# Patient Record
Sex: FEMALE | Race: WHITE | Employment: UNEMPLOYED | URBAN - METROPOLITAN AREA
[De-identification: names, ages, dates, MRNs, and addresses within clinical notes are randomized per-mention and may not be internally consistent; named-entity substitution may affect disease eponyms.]

---

## 2017-05-08 ENCOUNTER — OFFICE VISIT (OUTPATIENT)
Dept: INTERNAL MEDICINE CLINIC | Age: 57
End: 2017-05-08

## 2017-05-08 VITALS
SYSTOLIC BLOOD PRESSURE: 118 MMHG | DIASTOLIC BLOOD PRESSURE: 76 MMHG | WEIGHT: 132 LBS | HEIGHT: 68 IN | HEART RATE: 60 BPM | OXYGEN SATURATION: 98 % | BODY MASS INDEX: 20 KG/M2 | TEMPERATURE: 98.2 F

## 2017-05-08 DIAGNOSIS — Z01.818 PREOP EXAMINATION: Primary | ICD-10-CM

## 2017-05-08 DIAGNOSIS — R35.0 URINARY FREQUENCY: ICD-10-CM

## 2017-05-08 DIAGNOSIS — E03.9 ACQUIRED HYPOTHYROIDISM: ICD-10-CM

## 2017-05-08 DIAGNOSIS — J30.9 ALLERGIC RHINITIS, UNSPECIFIED ALLERGIC RHINITIS TRIGGER, UNSPECIFIED RHINITIS SEASONALITY: ICD-10-CM

## 2017-05-08 PROCEDURE — 99242 OFF/OP CONSLTJ NEW/EST SF 20: CPT | Performed by: NURSE PRACTITIONER

## 2017-05-08 ASSESSMENT — ENCOUNTER SYMPTOMS
SHORTNESS OF BREATH: 0
BACK PAIN: 1
CHEST TIGHTNESS: 0
ABDOMINAL PAIN: 0

## 2017-07-05 ENCOUNTER — PATIENT MESSAGE (OUTPATIENT)
Dept: INTERNAL MEDICINE CLINIC | Age: 57
End: 2017-07-05

## 2017-07-19 ENCOUNTER — TELEPHONE (OUTPATIENT)
Dept: INTERNAL MEDICINE CLINIC | Age: 57
End: 2017-07-19

## 2018-03-09 ENCOUNTER — TELEPHONE (OUTPATIENT)
Dept: INTERNAL MEDICINE CLINIC | Age: 58
End: 2018-03-09

## 2018-03-09 DIAGNOSIS — E03.9 ACQUIRED HYPOTHYROIDISM: Primary | ICD-10-CM

## 2018-03-28 ENCOUNTER — TELEPHONE (OUTPATIENT)
Dept: FAMILY MEDICINE CLINIC | Age: 58
End: 2018-03-28

## 2018-03-29 NOTE — TELEPHONE ENCOUNTER
Jenni Whalen - you should be able to view the document now. Let me know if there are problems viewing.

## 2018-04-25 ENCOUNTER — TELEPHONE (OUTPATIENT)
Dept: FAMILY MEDICINE CLINIC | Age: 58
End: 2018-04-25

## 2018-10-05 ENCOUNTER — TELEPHONE (OUTPATIENT)
Dept: INTERNAL MEDICINE CLINIC | Age: 58
End: 2018-10-05

## 2018-10-05 DIAGNOSIS — E89.40 SURGICAL MENOPAUSE: Primary | ICD-10-CM

## 2018-10-05 DIAGNOSIS — E03.9 ACQUIRED HYPOTHYROIDISM: ICD-10-CM

## 2018-10-05 NOTE — TELEPHONE ENCOUNTER
Needs an order for a bone density test faxed to [facility] at [phone]  Please call patient to let her know it was faxed

## 2018-10-24 DIAGNOSIS — Z87.39 HISTORY OF OSTEOPENIA: Primary | ICD-10-CM

## 2018-10-29 DIAGNOSIS — Z87.39 HISTORY OF OSTEOPENIA: ICD-10-CM

## 2018-10-29 LAB — VITAMIN D 25-HYDROXY: 69.3 NG/ML

## 2018-12-13 ENCOUNTER — OFFICE VISIT (OUTPATIENT)
Dept: INTERNAL MEDICINE CLINIC | Age: 58
End: 2018-12-13
Payer: COMMERCIAL

## 2018-12-13 VITALS
TEMPERATURE: 98.7 F | WEIGHT: 135.8 LBS | BODY MASS INDEX: 20.65 KG/M2 | DIASTOLIC BLOOD PRESSURE: 56 MMHG | HEART RATE: 67 BPM | SYSTOLIC BLOOD PRESSURE: 100 MMHG | OXYGEN SATURATION: 99 %

## 2018-12-13 DIAGNOSIS — J04.0 LARYNGITIS: Primary | ICD-10-CM

## 2018-12-13 DIAGNOSIS — J20.9 ACUTE BRONCHITIS, UNSPECIFIED ORGANISM: ICD-10-CM

## 2018-12-13 PROCEDURE — 99213 OFFICE O/P EST LOW 20 MIN: CPT | Performed by: INTERNAL MEDICINE

## 2018-12-13 RX ORDER — ESTRADIOL 0.1 MG/G
CREAM VAGINAL
Refills: 6 | COMMUNITY
Start: 2018-10-18

## 2018-12-13 RX ORDER — BENZONATATE 100 MG/1
100 CAPSULE ORAL 3 TIMES DAILY PRN
Qty: 30 CAPSULE | Refills: 0 | Status: SHIPPED | OUTPATIENT
Start: 2018-12-13 | End: 2018-12-20

## 2018-12-13 RX ORDER — VALACYCLOVIR HYDROCHLORIDE 500 MG/1
500 TABLET, FILM COATED ORAL 2 TIMES DAILY PRN
Qty: 60 TABLET | Refills: 1 | Status: SHIPPED | OUTPATIENT
Start: 2018-12-13

## 2018-12-13 RX ORDER — AZITHROMYCIN 250 MG/1
TABLET, FILM COATED ORAL
Qty: 6 TABLET | Refills: 0 | Status: SHIPPED | OUTPATIENT
Start: 2018-12-13 | End: 2018-12-23

## 2018-12-13 RX ORDER — LEVOTHYROXINE SODIUM 88 MCG
88 TABLET ORAL DAILY
COMMUNITY
Start: 2018-11-28

## 2018-12-13 ASSESSMENT — ENCOUNTER SYMPTOMS
SORE THROAT: 0
SHORTNESS OF BREATH: 0

## 2019-01-11 ENCOUNTER — CLINICAL DOCUMENTATION (OUTPATIENT)
Dept: INTERNAL MEDICINE CLINIC | Age: 59
End: 2019-01-11

## 2019-01-15 ENCOUNTER — CLINICAL DOCUMENTATION (OUTPATIENT)
Dept: INTERNAL MEDICINE CLINIC | Age: 59
End: 2019-01-15

## 2019-02-06 ENCOUNTER — CLINICAL DOCUMENTATION (OUTPATIENT)
Dept: INTERNAL MEDICINE CLINIC | Age: 59
End: 2019-02-06

## 2019-03-28 ENCOUNTER — OFFICE VISIT (OUTPATIENT)
Dept: INTERNAL MEDICINE CLINIC | Age: 59
End: 2019-03-28
Payer: COMMERCIAL

## 2019-03-28 VITALS
BODY MASS INDEX: 20.59 KG/M2 | TEMPERATURE: 98.2 F | HEART RATE: 75 BPM | SYSTOLIC BLOOD PRESSURE: 96 MMHG | WEIGHT: 135.4 LBS | OXYGEN SATURATION: 98 % | DIASTOLIC BLOOD PRESSURE: 62 MMHG

## 2019-03-28 DIAGNOSIS — J30.2 SEASONAL ALLERGIC RHINITIS, UNSPECIFIED TRIGGER: Primary | ICD-10-CM

## 2019-03-28 PROCEDURE — 99213 OFFICE O/P EST LOW 20 MIN: CPT | Performed by: INTERNAL MEDICINE

## 2019-03-28 ASSESSMENT — ENCOUNTER SYMPTOMS
COUGH: 0
RHINORRHEA: 1
SORE THROAT: 1
TROUBLE SWALLOWING: 1
SHORTNESS OF BREATH: 0

## 2019-03-28 ASSESSMENT — PATIENT HEALTH QUESTIONNAIRE - PHQ9
2. FEELING DOWN, DEPRESSED OR HOPELESS: 0
1. LITTLE INTEREST OR PLEASURE IN DOING THINGS: 0
SUM OF ALL RESPONSES TO PHQ9 QUESTIONS 1 & 2: 0
SUM OF ALL RESPONSES TO PHQ QUESTIONS 1-9: 0
SUM OF ALL RESPONSES TO PHQ QUESTIONS 1-9: 0

## 2019-04-01 ENCOUNTER — TELEPHONE (OUTPATIENT)
Dept: INTERNAL MEDICINE CLINIC | Age: 59
End: 2019-04-01

## 2019-04-02 ENCOUNTER — OFFICE VISIT (OUTPATIENT)
Dept: INTERNAL MEDICINE CLINIC | Age: 59
End: 2019-04-02
Payer: COMMERCIAL

## 2019-04-02 VITALS
SYSTOLIC BLOOD PRESSURE: 94 MMHG | TEMPERATURE: 98.1 F | WEIGHT: 135 LBS | HEIGHT: 68 IN | OXYGEN SATURATION: 99 % | HEART RATE: 77 BPM | DIASTOLIC BLOOD PRESSURE: 70 MMHG | BODY MASS INDEX: 20.46 KG/M2

## 2019-04-02 DIAGNOSIS — J20.9 BRONCHITIS, ACUTE, WITH BRONCHOSPASM: Primary | ICD-10-CM

## 2019-04-02 PROCEDURE — 99214 OFFICE O/P EST MOD 30 MIN: CPT | Performed by: INTERNAL MEDICINE

## 2019-04-02 RX ORDER — AZITHROMYCIN 250 MG/1
TABLET, FILM COATED ORAL
Qty: 1 PACKET | Refills: 0 | Status: SHIPPED | OUTPATIENT
Start: 2019-04-02 | End: 2019-04-12

## 2019-04-02 RX ORDER — BENZONATATE 200 MG/1
200 CAPSULE ORAL 3 TIMES DAILY PRN
Qty: 30 CAPSULE | Refills: 0 | Status: SHIPPED | OUTPATIENT
Start: 2019-04-02 | End: 2019-04-09

## 2019-04-02 NOTE — PROGRESS NOTES
Subjective  Patient complains of deep cough, congestion and phlegm production for the last several days. Also has had shortness of breath and wheezing. Symptoms get worse in the morning and at night and get better as the day progresses. The illness shows no sign of improvement. Complains of chest pressure and pain during a coughing episode. Has had fever and chills. No chest pain. Very tired and fatigued. Has had body aches. No nausea or emesis. No pedal edema. No dizziness. No acid reflux. Denies sore throat or otalgias. Tried over the counter medications with not much improvement. Has also tried inhalers with some help. Patient has been exposed to some people at work/home with similar symptoms. Non-smoker. No exposure to sick contacts,   Allergies   Allergen Reactions    Dextromethorphan Swelling    Ciprofloxacin Rash    Codeine     Penicillins     Sulfa Antibiotics     Morphine And Related Rash      Objective  BP 94/70 (Site: Right Upper Arm, Position: Sitting, Cuff Size: Medium Adult)   Pulse 77   Temp 98.1 °F (36.7 °C) (Oral)   Ht 5' 8\" (1.727 m)   Wt 135 lb (61.2 kg)   LMP 02/26/2016   SpO2 99%   BMI 20.53 kg/m²    Patient  looks ill to a mild to moderate extent. Visibly short of breath. Has audible wheezes. has some sinus tenderness. Ears show tympanic membrane congestion. Pharynx shows some posterior congestion and some postnasal drainage. Has some mild tender neck lymphadenopathy. Examination of the lungs reveal mildly labored respirations. Also has diffuse wheezing and rhonchi. No crackles heard. no rub or fremitus noted. Breath sounds have lowered intensity and has a prolonged expiratory phase. Heart sounds are normal, regular rate and rhythm, no murmur, gallop or rub noted. Apical impulse is in the left fifth ICS and appears normal.    Assessment  Bronchitis  Bronchospasm  Plan  Will start antibiotics, a tapering course of steroids and inhalers. Will start bronchodilators.  Also recommend that the patient start some otc decongestants. Asked the patient to consume a lot of fluids, avoid greasy foods and smoke. Allergies could be playing a role in patients symptoms. Trying otc allergy meds like claritin, allegra or zyrtec would also be an option. Call us if symptoms do not improve in 2-3 days.

## 2020-03-25 PROBLEM — J30.9 ALLERGIC RHINITIS: Status: RESOLVED | Noted: 2020-03-25 | Resolved: 2020-03-24

## 2020-05-05 ENCOUNTER — TELEPHONE (OUTPATIENT)
Dept: PRIMARY CARE CLINIC | Age: 60
End: 2020-05-05

## 2020-09-29 ENCOUNTER — TELEPHONE (OUTPATIENT)
Dept: PRIMARY CARE CLINIC | Age: 60
End: 2020-09-29

## 2020-09-29 NOTE — TELEPHONE ENCOUNTER
Pt called to ask if she should get her flu shot even tho she has had ear pain for 8 days an she used wax ear plugs 8 days ago and that was when they started hurting .

## 2020-10-22 ENCOUNTER — OFFICE VISIT (OUTPATIENT)
Dept: PRIMARY CARE CLINIC | Age: 60
End: 2020-10-22

## 2020-10-22 PROCEDURE — 99211 OFF/OP EST MAY X REQ PHY/QHP: CPT | Performed by: NURSE PRACTITIONER

## 2020-10-24 LAB — SARS-COV-2, NAA: NOT DETECTED

## 2020-10-26 NOTE — RESULT ENCOUNTER NOTE

## 2021-01-05 ENCOUNTER — OFFICE VISIT (OUTPATIENT)
Dept: PRIMARY CARE CLINIC | Age: 61
End: 2021-01-05

## 2021-01-05 DIAGNOSIS — Z20.828 EXPOSURE TO SARS-ASSOCIATED CORONAVIRUS: Primary | ICD-10-CM

## 2021-01-05 NOTE — PROGRESS NOTES
Alma Mora received a viral test for COVID-19. They were educated on isolation and quarantine as appropriate. For any symptoms, they were directed to seek care from their PCP, given contact information to establish with a doctor, directed to an urgent care or the emergency room.

## 2021-01-05 NOTE — PATIENT INSTRUCTIONS

## 2021-01-06 LAB — SARS-COV-2, NAA: NOT DETECTED

## 2021-01-07 ENCOUNTER — OFFICE VISIT (OUTPATIENT)
Dept: ORTHOPEDIC SURGERY | Age: 61
End: 2021-01-07
Payer: COMMERCIAL

## 2021-01-07 VITALS — TEMPERATURE: 98.4 F | WEIGHT: 134.92 LBS | HEIGHT: 68 IN | BODY MASS INDEX: 20.45 KG/M2

## 2021-01-07 DIAGNOSIS — M54.12 RADICULITIS OF RIGHT CERVICAL REGION: ICD-10-CM

## 2021-01-07 DIAGNOSIS — S46.011A STRAIN OF RIGHT ROTATOR CUFF CAPSULE, INITIAL ENCOUNTER: ICD-10-CM

## 2021-01-07 DIAGNOSIS — M47.812 CERVICAL SPONDYLOSIS: ICD-10-CM

## 2021-01-07 DIAGNOSIS — M25.511 ACUTE PAIN OF RIGHT SHOULDER: ICD-10-CM

## 2021-01-07 DIAGNOSIS — M50.30 DDD (DEGENERATIVE DISC DISEASE), CERVICAL: ICD-10-CM

## 2021-01-07 DIAGNOSIS — M54.2 NECK PAIN ON RIGHT SIDE: ICD-10-CM

## 2021-01-07 PROCEDURE — 99203 OFFICE O/P NEW LOW 30 MIN: CPT | Performed by: INTERNAL MEDICINE

## 2021-01-07 RX ORDER — CYCLOBENZAPRINE HCL 10 MG
10 TABLET ORAL NIGHTLY PRN
Qty: 30 TABLET | Refills: 1 | Status: SHIPPED | OUTPATIENT
Start: 2021-01-07 | End: 2022-01-06 | Stop reason: ALTCHOICE
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
## 2021-01-11 ENCOUNTER — TELEPHONE (OUTPATIENT)
Dept: PRIMARY CARE CLINIC | Age: 61
End: 2021-01-11

## 2021-01-11 NOTE — TELEPHONE ENCOUNTER
----- Message from Stanislav Yancey sent at 1/11/2021  2:39 PM EST -----  Subject: Message to Provider    QUESTIONS  Information for Provider? Pt is fearful of COVID and wanted to know if she   needs to come in for annual physical or if lab work would be okay.   ---------------------------------------------------------------------------  --------------  [address]  What is the best way for the office to contact you? OK to leave message on   voicemail  Preferred Call Back Phone Number? [phone]  ---------------------------------------------------------------------------  --------------  SCRIPT ANSWERS  Relationship to Patient?  Self

## 2021-01-13 ENCOUNTER — OFFICE VISIT (OUTPATIENT)
Dept: PRIMARY CARE CLINIC | Age: 61
End: 2021-01-13
Payer: COMMERCIAL

## 2021-01-13 VITALS
HEART RATE: 88 BPM | OXYGEN SATURATION: 98 % | TEMPERATURE: 97.9 F | DIASTOLIC BLOOD PRESSURE: 68 MMHG | RESPIRATION RATE: 14 BRPM | BODY MASS INDEX: 20.16 KG/M2 | WEIGHT: 133 LBS | SYSTOLIC BLOOD PRESSURE: 100 MMHG | HEIGHT: 68 IN

## 2021-01-13 DIAGNOSIS — Z20.822 CONTACT WITH AND (SUSPECTED) EXPOSURE TO COVID-19: ICD-10-CM

## 2021-01-13 DIAGNOSIS — Z00.00 PHYSICAL EXAM, ANNUAL: Primary | ICD-10-CM

## 2021-01-13 DIAGNOSIS — F41.9 ANXIETY: ICD-10-CM

## 2021-01-13 DIAGNOSIS — E03.9 ACQUIRED HYPOTHYROIDISM: Chronic | ICD-10-CM

## 2021-01-13 DIAGNOSIS — Z87.39 HISTORY OF OSTEOPENIA: ICD-10-CM

## 2021-01-13 PROCEDURE — 99396 PREV VISIT EST AGE 40-64: CPT | Performed by: INTERNAL MEDICINE

## 2021-01-13 SDOH — ECONOMIC STABILITY: FOOD INSECURITY: WITHIN THE PAST 12 MONTHS, YOU WORRIED THAT YOUR FOOD WOULD RUN OUT BEFORE YOU GOT MONEY TO BUY MORE.: NEVER TRUE

## 2021-01-13 SDOH — ECONOMIC STABILITY: INCOME INSECURITY: HOW HARD IS IT FOR YOU TO PAY FOR THE VERY BASICS LIKE FOOD, HOUSING, MEDICAL CARE, AND HEATING?: NOT VERY HARD

## 2021-01-13 ASSESSMENT — PATIENT HEALTH QUESTIONNAIRE - PHQ9
1. LITTLE INTEREST OR PLEASURE IN DOING THINGS: 0
SUM OF ALL RESPONSES TO PHQ9 QUESTIONS 1 & 2: 0
SUM OF ALL RESPONSES TO PHQ QUESTIONS 1-9: 0

## 2021-01-13 NOTE — PROGRESS NOTES
Chief complaints:  Shoaib Maharaj is a 64 y.o. female who presents to the office for a general physical examination. History of present illness:  Here for a physical and fasting blood work. Acquired hypothyroidism  On synthroid,  Patient is compliant w medications, no side effects, effective, provides adequate symptom relief. No new symptoms or problems as noted by patient. The problem is stable, no changes noted by patient. Will consider monitoring labs and refill medications as appropriate. Patient counseled and will continue current plan. Anxiety  Not on any meds,   Stable. History of osteopenia  Will check vit D levels,      Past Medical History:   Diagnosis Date    Allergic rhinitis, cause unspecified     Anxiety state, unspecified     Chronic lymphocytic thyroiditis     Colon polyp 2019    Diverticulitis of colon (without mention of hemorrhage)(562.11)     ? diagnosis; no diverticula noted on colonoscopy    Herpes simplex without mention of complication     Hormone replacement therapy (postmenopausal)     Mechelle Macias    Hypothyroidism     Left thyroid nodule 2017    repeat thyroid sonogram in Dec, 2017.  Nontoxic uninodular goiter     Osteopenia after menopause 2018    spine    Rosacea     Unspecified glaucoma(365.9)     Unspecified inflammatory disease of uterus      Current Outpatient Medications   Medication Sig Dispense Refill    Cetirizine HCl (ZYRTEC ALLERGY PO) Take by mouth daily      estradiol (ESTRACE) 0.1 MG/GM vaginal cream INSERT 1 GRAM VAGINALLY WITH APPLICATOR THREE TIMES WEEKLY  6    SYNTHROID 88 MCG tablet Take 88 mcg by mouth daily      calcium carbonate-vitamin D (CALTRATE) 600-400 MG-UNIT TABS per tab Take 1 tablet by mouth.       cyclobenzaprine (FLEXERIL) 10 MG tablet Take 1 tablet by mouth nightly as needed for Muscle spasms (Patient not taking: Reported on 1/13/2021) 30 tablet 1    valACYclovir (VALTREX) 500 MG tablet Take 1 tablet by mouth 2 times daily as needed (herpes outbreak) (Patient not taking: Reported on 1/13/2021) 60 tablet 1     No current facility-administered medications for this visit. Allergies : Dextromethorphan, Ciprofloxacin, Codeine, Penicillins, Sulfa antibiotics, and Morphine and related  Past Surgical History:   Procedure Laterality Date    COLONOSCOPY  2019    Repeat in 2024 - 5 yrs    LAPAROSCOPY      ovarian cysts, multiple laparoscopies    ARTURO AND BSO  02/26/2016    Dr. Reynaldo Calixto     Family History   Problem Relation Age of Onset    Diabetes Mother     Other Mother         PUD    Coronary Art Dis Father         CABG     Social History     Tobacco Use    Smoking status: Never Smoker    Smokeless tobacco: Never Used   Substance Use Topics    Alcohol use: No       Review of systems :  Skin: no abnormal pigmentation, rash, scaling, itching, masses, hair or nail changes  Eyes: negative  Ears/Nose/Throat: negative  Respiratory: negative  Cardiovascular: negative  Gastrointestinal: negative  Genitourinary: negative  Musculoskeletal: negative  Neurologic: negative  Psychiatric: negative  Hematologic/Lymphatic/Immunologic: negative  Endocrine: negative       Objective :     /68 (Site: Left Upper Arm, Position: Sitting)   Pulse 88   Temp 97.9 °F (36.6 °C) (Skin)   Resp 14   Ht 5' 8\" (1.727 m)   Wt 133 lb (60.3 kg)   LMP 02/26/2016   SpO2 98%   BMI 20.22 kg/m²   General appearance - healthy, alert, no distress  Skin - Skin color, texture, turgor normal. No rashes or lesions. Head - Normocephalic. No masses, lesions, tenderness or abnormalities  Eyes - conjunctivae/corneas clear. PERRL, EOM's intact. Ears - External ears normal. Canals clear. TM's normal.  Nose/Sinuses - Nares normal. Septum midline. Mucosa normal. No drainage or sinus tenderness. Oropharynx - Lips, mucosa, and tongue normal. Teeth and gums normal. Oropharynx pink and patent  Neck - Neck supple. No adenopathy.  Thyroid symmetric, normal size,  Back - Back symmetric, no curvature. ROM normal. No CVA tenderness. Lungs - Percussion normal. Good diaphragmatic excursion. Lungs clear  Heart - Regular rate and rhythm, with no rub, murmur or gallop noted. Abdomen - Abdomen soft, non-tender. BS normal. No masses, organomegaly  Extremities - Extremities normal. No deformities, edema, or skin discolora  Musculoskeletal - Spine ROM normal. Muscular strength intact. Peripheral pulses - radial=2+,, femoral=2+, popliteal=2+, dorsalis pedis=2+,  Neuro - Gait normal. Reflexes normal and symmetric. Sensation grossly normal.  No focal weakness       Assessment :     Physical exam,  Acquired hypothyroidism  On synthroid,  Patient is compliant w medications, no side effects, effective, provides adequate symptom relief. No new symptoms or problems as noted by patient. The problem is stable, no changes noted by patient. Will consider monitoring labs and refill medications as appropriate. Patient counseled and will continue current plan. Anxiety  Not on any meds,   Stable. History of osteopenia  Will check vit D levels,         Plan :     As above,  Labs ordered, reviewed. Medications refilled. All Health maintenance needs reviewed and the needful ordered.        Nicolette Angel MD  1/13/2021 9:33 AM

## 2021-01-15 DIAGNOSIS — Z00.00 PHYSICAL EXAM, ANNUAL: ICD-10-CM

## 2021-01-15 DIAGNOSIS — Z20.822 CONTACT WITH AND (SUSPECTED) EXPOSURE TO COVID-19: ICD-10-CM

## 2021-01-15 LAB
A/G RATIO: 2.6 (ref 1.1–2.2)
ALBUMIN SERPL-MCNC: 4.6 G/DL (ref 3.4–5)
ALP BLD-CCNC: 59 U/L (ref 40–129)
ALT SERPL-CCNC: 20 U/L (ref 10–40)
ANION GAP SERPL CALCULATED.3IONS-SCNC: 14 MMOL/L (ref 3–16)
AST SERPL-CCNC: 20 U/L (ref 15–37)
BASOPHILS ABSOLUTE: 0 K/UL (ref 0–0.2)
BASOPHILS RELATIVE PERCENT: 1.5 %
BILIRUB SERPL-MCNC: 0.7 MG/DL (ref 0–1)
BUN BLDV-MCNC: 11 MG/DL (ref 7–20)
CALCIUM SERPL-MCNC: 9.7 MG/DL (ref 8.3–10.6)
CHLORIDE BLD-SCNC: 103 MMOL/L (ref 99–110)
CHOLESTEROL, TOTAL: 198 MG/DL (ref 0–199)
CO2: 25 MMOL/L (ref 21–32)
CREAT SERPL-MCNC: 0.7 MG/DL (ref 0.6–1.1)
EOSINOPHILS ABSOLUTE: 0.1 K/UL (ref 0–0.6)
EOSINOPHILS RELATIVE PERCENT: 1.7 %
GFR AFRICAN AMERICAN: >60
GFR NON-AFRICAN AMERICAN: >60
GLOBULIN: 1.8 G/DL
GLUCOSE BLD-MCNC: 82 MG/DL (ref 70–99)
HCT VFR BLD CALC: 46 % (ref 36–48)
HDLC SERPL-MCNC: 74 MG/DL (ref 40–60)
HEMOGLOBIN: 15.6 G/DL (ref 12–16)
LDL CHOLESTEROL CALCULATED: 109 MG/DL
LYMPHOCYTES ABSOLUTE: 1.1 K/UL (ref 1–5.1)
LYMPHOCYTES RELATIVE PERCENT: 35.2 %
MCH RBC QN AUTO: 31.3 PG (ref 26–34)
MCHC RBC AUTO-ENTMCNC: 33.9 G/DL (ref 31–36)
MCV RBC AUTO: 92.2 FL (ref 80–100)
MONOCYTES ABSOLUTE: 0.3 K/UL (ref 0–1.3)
MONOCYTES RELATIVE PERCENT: 9.9 %
NEUTROPHILS ABSOLUTE: 1.6 K/UL (ref 1.7–7.7)
NEUTROPHILS RELATIVE PERCENT: 51.7 %
PDW BLD-RTO: 13.2 % (ref 12.4–15.4)
PLATELET # BLD: 231 K/UL (ref 135–450)
PMV BLD AUTO: 9.2 FL (ref 5–10.5)
POTASSIUM SERPL-SCNC: 3.9 MMOL/L (ref 3.5–5.1)
RBC # BLD: 4.99 M/UL (ref 4–5.2)
SARS-COV-2 ANTIBODY, TOTAL: NEGATIVE
SODIUM BLD-SCNC: 142 MMOL/L (ref 136–145)
TOTAL PROTEIN: 6.4 G/DL (ref 6.4–8.2)
TRIGL SERPL-MCNC: 73 MG/DL (ref 0–150)
TSH SERPL DL<=0.05 MIU/L-ACNC: 1.86 UIU/ML (ref 0.27–4.2)
VITAMIN D 25-HYDROXY: 53.2 NG/ML
VLDLC SERPL CALC-MCNC: 15 MG/DL
WBC # BLD: 3.1 K/UL (ref 4–11)

## 2021-01-16 LAB
ESTIMATED AVERAGE GLUCOSE: 91.1 MG/DL
HBA1C MFR BLD: 4.8 %

## 2021-01-20 ENCOUNTER — HOSPITAL ENCOUNTER (OUTPATIENT)
Dept: PHYSICAL THERAPY | Age: 61
Setting detail: THERAPIES SERIES
Discharge: HOME | End: 2021-01-20
Payer: COMMERCIAL

## 2021-01-20 PROCEDURE — 97161 PT EVAL LOW COMPLEX 20 MIN: CPT

## 2021-01-20 PROCEDURE — 97110 THERAPEUTIC EXERCISES: CPT

## 2021-01-20 NOTE — PLAN OF CARE
The [address]  Phone [phone]  Fax [phone]                                                          Physical Therapy Certification    Dear Referring Practitioner: Radha Wiggins MD,    We had the pleasure of evaluating the following patient for physical therapy services at [address] [place] [address]. A summary of our findings can be found in the initial assessment below. This includes our plan of care. If you have any questions or concerns regarding these findings, please do not hesitate to contact me at the office phone number checked above. Thank you for the referral.       Physician Signature:_______________________________Date:__________________  By signing above (or electronic signature), therapists plan is approved by physician      Patient: Melany Morales   : 1960   MRN: [id number]  Referring Physician: Referring Practitioner: Radha Wiggins MD      Evaluation Date: 2021      Medical Diagnosis Information:  Diagnosis: O40.818 (ICD-10-CM) - Cervical spondylosis. M50.30 (ICD-10-CM) - DDD (degenerative disc disease), cervical. M54.12 (ICD-10-CM) - Radiculitis of right cervical region. S46.011A (ICD-10-CM) - Strain of right rotator cuff capsule, initial encounter   Treatment Diagnosis: R shoulder stiffness M25.611.  Cervical pain M54.2                                         Insurance information: PT Insurance Information: BCBS    Precautions/ Contra-indications: Osteopenia after menopause    Latex Allergy:  []NO      []YES  Preferred Language for Healthcare:   [x]English       []other: [] Yes, Patient intake triggered further evaluation      [] C-SSRS Screening completed  [] PCP notified via Plan of Care  [] Emergency services notified     Functional Disability Index: UEFI:  26% deficit 1/20/21    Pain Scale: 0/10 at rest, but states she feels weak and very minimal tingling in hand at rest   Easing factors: OTC NSAIDs, activity modification for 1 month, and chiropractor/manipulation  Provocative factors: Cleaning, working out, cooking, changing positions when sleeping      Type: []Constant   [x]Intermittent  [x]Radiating []Localized []other:       Numbness/Tingling: At rest, very minimal tingling sensation in all fingers of R hand. With certain movements, this sensation increases.       Occupation/School: Pt is employed and works at [employer]     Living Status/Prior Level of Function: Independent with ADLs and IADLs    OBJECTIVE:      Cervical ROM AROM Comments   Flexion 55 Repeated flex neg for reproduction of symptoms   Extension 45 Repeated ext neg for reproduction of symptoms   Side bend R 23    Side bend L 31    Rotation R 49    Rotation L 45      Shoulder ROM PROM AROM  Comment    L R L R    Flexion 178 174  166 131 tingling in all fingers  R shoulder hike   Abduction  180  143 tingling in all fingers R shoulder hike    96 at 90 abd      IR 90 58 at 90 abd         Strength L R Comments   Neck flexion (C1-2) 4- 4-    Neck side bend (C3) 4 4    Shoulder elevation (C4) 5 5    Shoulder abduction (C5) 4+ 4-    Elbow flexion   (C6) 5 5    Elbow extension   (C7) 4+ 4+    ER 4- 4-    IR 4+ 4    Supraspinatus  4- 4    Ulnar deviation ((C8) 5 5    Abduction and /or adduction of hand intrinsics (T1) WNL WNL    Hand dynamometer  test 16 lbs of force  15 lbs of force Highest # recorded of 3 trials   DTR L R Comments   Biceps (C5,C6) 2+ 2+    Brachioradialis (C5,C6)      Triceps (C7,C8) 1+ 1+                Special Tests Results/Comment   Axial compression Negative Spurling's Pt apprehensive, full Spurling's unable to be completed  Negative with extension & compression   Negative with extension and R SB (no compression)  Negative with extension and L SB (no compression)   ULNT Positive R median and ulnar nerve tension   Negative R radial nerve tension   Negative L median ulnar and radial nerve tension    Distraction test Negative   Hawkin's Abel Negative bilat  Excessive shoulder hike on R    Neers Negative bilat   Belly press  Negative on R   Empty can  Negative bilat   Phalen's  Reverse phalens 30 seconds: Negative   30 seconds: Negative        Joint mobility:    []Normal    [x]Hypo Mild-Mod restriction in cervical spine with P/A mobilizations, and R>L mild-mod restriction in lateral gliding. Moderate restriction   []Hyper    Palpation: Tightness noted in bilat upper trap and cervical paraspinal musculature. Pt did not complain of tenderness upon palpation. Mild guarding noted upon testing joint mobility in cervical spine. Functional Mobility/Transfers: Independent    Posture: Pt sits with bilat protraction R>L, R shoulder elevation, moderate forward head, and bilat scapular abduction. Bandages/Dressings/Incisions: N/A    Gait: (include devices/WB status): WNL     Orthopedic Special Tests: See above. [x] Patient history, allergies, meds reviewed. Medical chart reviewed. See intake form. Review Of Systems (ROS):  [x]Performed Review of systems (Integumentary, CardioPulmonary, Neurological) by intake and observation. Intake form has been scanned into medical record. Patient has been instructed to contact their primary care physician regarding ROS issues if not already being addressed at this time.       Co-morbidities/Complexities (which will affect course of rehabilitation):   []None           Arthritic conditions   []Rheumatoid arthritis (M05.9)  []Osteoarthritis (M19.91)   Cardiovascular conditions   []Hypertension (I10) Functional Activity Limitations (from functional questionnaire and intake)   []Reduced ability to tolerate prolonged functional positions   []Reduced ability or difficulty with changes of positions or transfers between positions   [x]Reduced ability to maintain good posture and demonstrate good body mechanics with sitting, bending, and lifting   [x] Reduced ability or tolerance with driving and/or computer work   [x]Reduced ability to perform lifting, reaching, carrying tasks   []Reduced ability to concentrate   [x]Reduced ability to sleep    []Reduced ability to tolerate any impact through UE or spine   []Reduced ability to ambulate prolonged functional periods/distances   []other:    Participation Restrictions   [x]Reduced participation in self care activities   [x]Reduced participation in home management activities   [x]Reduced participation in work activities   [x]Reduced participation in social activities. [x]Reduced participation in sport/recreational activities. Classification/Subgrouping:   []signs/symptoms consistent with neck pain with mobility deficits     []signs/symptoms consistent with neck pain with movement coordinated impairments    [x]signs/symptoms consistent with neck pain with radiating pain    []signs/symptoms consistent with neck pain with headaches (cervicogenic)    []Signs/symptoms consistent with nerve root involvement including myotome & dermatome dysfunction   [x]sign/symptoms consistent with facet dysfunction of cervical and thoracic spine    []signs/symptoms consistent suggesting central cord compression/UMN syndromes   []signs/symptoms consistent with discogenic cervical pain   []signs/symptoms consistent with rib dysfunction   [x]signs/symptoms consistent with postural dysfunction   []signs/symptoms consistent with shoulder pathology    []signs/symptoms consistent with post-surgical status including decreased ROM, strength and function. []signs/symptoms consistent with pathology which may benefit from Dry Needling   []signs/symptoms which may limit the use of advanced manual therapy techniques: (Hypertension, recent trauma, intolerance to end range positions, prior TIA, visual issues, UE myotomes loss )     Prognosis/Rehab Potential:      []Excellent   [x]Good    []Fair   []Poor    Tolerance of evaluation/treatment:    []Excellent   [x]Good    []Fair   []Poor      Physical Therapy Evaluation Complexity Justification  [x] A history of present problem with:  [] no personal factors and/or comorbidities that impact the plan of care;  [x]1-2 personal factors and/or comorbidities that impact the plan of care  []3 personal factors and/or comorbidities that impact the plan of care  [x] An examination of body systems using standardized tests and measures addressing any of the following: body structures and functions (impairments), activity limitations, and/or participation restrictions;:  [x] a total of 1-2 or more elements   [] a total of 3 or more elements   [] a total of 4 or more elements   [x] A clinical presentation with:  [x] stable and/or uncomplicated characteristics   [] evolving clinical presentation with changing characteristics  [] unstable and unpredictable characteristics;   [x] Clinical decision making of [x] low, [] moderate, [] high complexity using standardized patient assessment instrument and/or measurable assessment of functional outcome. [x] EVAL (LOW) 55563 (typically 20 minutes face-to-face)  [] EVAL (MOD) 31671 (typically 30 minutes face-to-face)  [] EVAL (HIGH) 95796 (typically 45 minutes face-to-face)  [] RE-EVAL     PLAN:   Frequency/Duration:  2 days per week for 4 Weeks:  Interventions:  [x]  Therapeutic exercise including: strength training, ROM, for cervical spine,scapula, core and Upper extremity, including postural re-education. [] Progressing: [] Met: [] Not Met: [] Adjusted    Long Term Goals: To be achieved in: 4 weeks  1. Disability index score of 10% or less for the UEFI to assist with reaching prior level of function. [] Progressing: [] Met: [] Not Met: [] Adjusted  2. Patient will demonstrate increased AROM and PROM of the R GHJ to equal the L GHJ to allow for proper joint functioning as indicated by patients Functional Deficits. [] Progressing: [] Met: [] Not Met: [] Adjusted  3. Patient will demonstrate increase strength of shoulder and cervical musculature to 4+/5 in order to allow for proper functional mobility as indicated by patient's Functional Deficits. [] Progressing: [] Met: [] Not Met: [] Adjusted  4. Patient will return to all functional activities without increased symptoms or restriction. [] Progressing: [] Met: [] Not Met: [] Adjusted  5. Patient will be able to participate in her normal workout regimen fully without increased symptoms or restriction. [] Progressing: [] Met: [] Not Met: [] Adjusted     Electronically signed by:  SAUL Enriquez, Student Physical Therapist  Therapist was present, directed the patient's care, made skilled judgement, and was responsible for assessment and treatment of the patient. Note: If patient does not return for scheduled/ recommended follow up visits, this note will serve as a discharge from care along with most recent update on progress.

## 2021-01-20 NOTE — FLOWSHEET NOTE
[facility]  Orthopaedics and Sports Rehabilitation, [facility] [place], [address] [place] [address]  Phone: [phone]   Fax:     [phone]                                                     Physical Therapy Daily Treatment Note  Date:  2021    Patient Name:  Ana Maria López    :  1960  MRN: [id number]  Restrictions/Precautions:    Medical/Treatment Diagnosis Information:  Diagnosis: R76.329 (ICD-10-CM) - Cervical spondylosis. M50.30 (ICD-10-CM) - DDD (degenerative disc disease), cervical. M54.12 (ICD-10-CM) - Radiculitis of right cervical region. S46.011A (ICD-10-CM) - Strain of right rotator cuff capsule, initial encounter  Treatment Diagnosis: R shoulder stiffness M25.611. Cervical pain Z47.2  Insurance/Certification information:  PT Insurance Information: [employer]ley [employer]  Physician Information:  Referring Practitioner: Art Edmondson MD  Has the plan of care been signed (Y/N):        []  Yes  [x]  No     Date of Patient follow up with Physician: Not scheduled yet.        Is this a Progress Report:     []  Yes  [x]  No        If Yes:  Date Range for reporting period:  Beginnin2021  Ending     Progress report will be due (10 Rx or 30 days whichever is less): 7692      Recertification will be due (POC Duration  / 90 days whichever is less): 21         Visit # Insurance Allowable Auth Required   1  2021 BCBS  20 PT visits []  Yes [x]  No        Functional Scale: UEFI 26% deficit Date assessed:  2021     Latex Allergy:  []NO      []YES  Preferred Language for Healthcare:   [x]English       []other:      Pain level:  0/10 at rest, but states she feels weak and very minimal tingling in hand at rest      SUBJECTIVE:  See eval    OBJECTIVE: See eval  ? Observation:   ? Test measurements:      RESTRICTIONS/PRECAUTIONS: Osteopenia after menopause    Exercises/Interventions:   Exercise/Equipment Resistance/Repetitions Other comments Stretching/PROM     CROM     Chin tuck     UT side bend stretch 10\"hx5 R seated  Start 1/20    Levator scap stretch 10\"hx5 R seated  Start 1/20    Scalene stretch     Pectoral stretch 10\"hx1 supine towel roll between scapulas   10\"hx5 @ wall corner  Start 1/20    Sleeper stretch NPV              Nerve glides     Median 1x10 R supine Start 1/20   Ulnar  1x10 R supine Start 1/20          Isometrics     Chin tuck 10\"hx5 Supine towel under occiput  Start 1/20    Scap squeezes  10\"hx5 standing at wall with towel between scapulas  Start 1/20 cues to incorporate chin tucks   shrugs     Cervical Flexion      Cervical Extension     Cervical sidebending               PRE's     External Rotation     Internal Rotation     Serratus     Biceps     Triceps     Shrugs     Horizontal Abd with ER     Reverse Flys     EXT     Flexion     Abduction          Cable Column/Theraband     Scapular Retraction     External Rotation     Internal Rotation     Ext     TRIC     Lats     Shrugs     Flex     BIC     PNF          Posture  Posture awareness and education while sitting for work                     Manual Intervention      Cerv mobs/manip      Thoracic mobs/manip      CT manip      Rib mobilizations        Therapeutic Exercise and NMR EXR  [x] (71140) Provided verbal/tactile cueing for activities related to strengthening, flexibility, endurance, ROM  for improvements in cervical, postural, scapular, scapulothoracic and UE control with self care, reaching, carrying, lifting, house/yardwork, driving/computer work.    [] (77120) Provided verbal/tactile cueing for activities related to improving balance, coordination, kinesthetic sense, posture, motor skill, proprioception  to assist with cervical, scapular, scapulothoracic and UE control with self care, reaching, carrying, lifting, house/yardwork, driving/computer work.     Therapeutic Activities: [] (97393 or 33513) Provided verbal/tactile cueing for activities related to improving balance, coordination, kinesthetic sense, posture, motor skill, proprioception and motor activation to allow for proper function of cervical, scapular, scapulothoracic and UE control with self care, carrying, lifting, driving/computer work.      Home Exercise Program:    [x] (14851) Reviewed/Progressed HEP activities related to strengthening, flexibility, endurance, ROM of cervical, scapular, scapulothoracic and UE control with self care, reaching, carrying, lifting, house/yardwork, driving/computer work  [] (72218) Reviewed/Progressed HEP activities related to improving balance, coordination, kinesthetic sense, posture, motor skill, proprioception of cervical, scapular, scapulothoracic and UE control with self care, reaching, carrying, lifting, house/yardwork, driving/computer work      Manual Treatments:  PROM / STM / Oscillations-Mobs:  G-I, II, III, IV (PA's, Inf., Post.)  [] (88780) Provided manual therapy to mobilize soft tissue/joints of cervical/CT, scapular GHJ and UE for the purpose of decreasing headache, modulating pain, promoting relaxation,  increasing ROM, reducing/eliminating soft tissue swelling/inflammation/restriction, improving soft tissue extensibility and allowing for proper ROM for normal function with self care, reaching, carrying, lifting, house/yardwork, driving/computer work    Modalities:  N/A 1/21/2021    Charges:  Timed Code Treatment Minutes: Eval+ 25'   Total Treatment Minutes: 11:42-12:53  71'       [x] EVAL (LOW) 60850 (typically 20 minutes face-to-face)  [] EVAL (MOD) 04893 (typically 30 minutes face-to-face)  [] EVAL (HIGH) 72881 (typically 45 minutes face-to-face)  [] RE-EVAL     [x] CR(99835) x   2  [] IONTO  [] NMR (21461) x     [] VASO  [] Manual (84789) x      [] Other:  [] TA x      [] Mech Traction (95749)  [] ES(attended) (92954)      [] ES (un) (44463):     GOALS: Patient stated goal: To be proactive in order to decrease pain & tingling sensation in hands when performing house chores and to strengthen the neck and shoulder. [] Progressing: [] Met: [] Not Met: [] Adjusted    Therapist goals for Patient:   Short Term Goals: To be achieved in: 2 weeks  1. Independent in HEP and progression per patient tolerance, in order to prevent re-injury. [] Progressing: [] Met: [] Not Met: [] Adjusted   2. Patient will have a decrease in pain to facilitate improvement in movement, function, and ADLs as indicated by Functional Deficits. [] Progressing: [] Met: [] Not Met: [] Adjusted    Long Term Goals: To be achieved in: 4 weeks  1. Disability index score of 10% or less for the UEFI to assist with reaching prior level of function. [] Progressing: [] Met: [] Not Met: [] Adjusted  2. Patient will demonstrate increased AROM and PROM of the R GHJ to equal the L GHJ to allow for proper joint functioning as indicated by patients Functional Deficits. [] Progressing: [] Met: [] Not Met: [] Adjusted  3. Patient will demonstrate increase strength of shoulder and cervical musculature to 4+/5 in order to allow for proper functional mobility as indicated by patient's Functional Deficits. [] Progressing: [] Met: [] Not Met: [] Adjusted  4. Patient will return to all functional activities without increased symptoms or restriction. [] Progressing: [] Met: [] Not Met: [] Adjusted  5. Patient will be able to participate in her normal workout regimen fully without increased symptoms or restriction. [] Progressing: [] Met: [] Not Met: [] Adjusted       Overall Progression Towards Functional goals/ Treatment Progress Update:  [] Patient is progressing as expected towards functional goals listed. [] Progression is slowed due to complexities/Impairments listed. [] Progression has been slowed due to co-morbidities.   [x] Plan just implemented, too soon to assess goals progression <30days [] Goals require adjustment due to lack of progress  [] Patient is not progressing as expected and requires additional follow up with physician  [] Other    Prognosis for POC: [x] Good [] Fair  [] Poor      Patient requires continued skilled intervention: [x] Yes  [] No    Treatment/Activity Tolerance:  [x] Patient able to complete treatment  [] Patient limited by fatigue  [] Patient limited by pain     [] Patient limited by other medical complications  [] Other:   1/20: Pt able to tolerate exercises well this date with no increase of symptoms. Pt performed nerve glides in supine instead of sitting in order to avoid excessive shoulder elevation. Demonstrated pec stretch at corner wall in order to be more reproducible at home. Patient Education:   1/20: Educated pt on deficits, POC, posture awareness, and importance of HEP. PLAN: See eval  [] Continue per plan of care [] Alter current plan (see comments above)  [x] Plan of care initiated [] Hold pending MD visit [] Discharge  1/20: Recommended to see pt 2x/week for 4 weeks, however due to financial limitations, will plan on seeing pt 1x/week for 4 weeks. Electronically signed by:  Miguel Contreras, PT  Leesa Sorenson, Student Physical Therapist  Therapist was present, directed the patient's care, made skilled judgement, and was responsible for assessment and treatment of the patient. Note: If patient does not return for scheduled/ recommended follow up visits, this note will serve as a discharge from care along with most recent update on progress.

## 2021-01-27 ENCOUNTER — HOSPITAL ENCOUNTER (OUTPATIENT)
Dept: PHYSICAL THERAPY | Age: 61
Setting detail: THERAPIES SERIES
Discharge: HOME | End: 2021-01-27
Payer: COMMERCIAL

## 2021-01-27 PROCEDURE — 97140 MANUAL THERAPY 1/> REGIONS: CPT

## 2021-01-27 PROCEDURE — 97110 THERAPEUTIC EXERCISES: CPT

## 2021-01-27 NOTE — FLOWSHEET NOTE
[facility]  Orthopaedics and Sports Rehabilitation, [facility] [place]  Phone: [phone]   Fax:     [phone]                                                     Physical Therapy Daily Treatment Note  Date:  2021    Patient Name:  Diamond Banuelos    :  1960  MRN: [id number]  Restrictions/Precautions:    Medical/Treatment Diagnosis Information:  · Diagnosis: I36.513 (ICD-10-CM) - Cervical spondylosis. M50.30 (ICD-10-CM) - DDD (degenerative disc disease), cervical. M54.12 (ICD-10-CM) - Radiculitis of right cervical region. S46.011A (ICD-10-CM) - Strain of right rotator cuff capsule, initial encounter  · Treatment Diagnosis: R shoulder stiffness M25.611. Cervical pain K82.2  Insurance/Certification information:  PT Insurance Information: Leann Moran 150  Physician Information:  Referring Practitioner: Brock Oneill MD  Has the plan of care been signed (Y/N):        [x]  Yes  []  No     Date of Patient follow up with Physician: Not scheduled yet. Is this a Progress Report:     []  Yes  [x]  No        If Yes:  Date Range for reporting period:  Beginnin2021  Ending     Progress report will be due (10 Rx or 30 days whichever is less):       Recertification will be due (POC Duration  / 90 days whichever is less): 21         Visit # Insurance Allowable Auth Required   2  2021 [employer]  20 PT visits []  Yes [x]  No        Functional Scale: UEFI 26% deficit Date assessed:  2021     Latex Allergy:  []NO      []YES  Preferred Language for Healthcare:   [x]English       []other:      Pain level:  : 0/10     SUBJECTIVE:  : Pt states she feels a little numbness and tingling in her hands today at rest. Pt states she was not as consistent performing her HEP. Reports that she wants to complete HEP independently d/t financial issues.      OBJECTIVE: See eval  ? Observation:   ? Test measurements: RESTRICTIONS/PRECAUTIONS: Osteopenia after menopause    Exercises/Interventions:   Exercise/Equipment Resistance/Repetitions Other comments   Stretching/PROM     CROM     Chin tuck     UT side bend stretch 10\"hx5 R seated  Start 1/20    Levator scap stretch 10\"hx5 R seated  Start 1/20    Scalene stretch     Pectoral stretch 10\"hx5 supine towel roll between scapulas  ^1/27   Sleeper stretch 10\"hx5 R Start 1/27             Nerve glides     Median 2x10 R sitting ^1/27   Ulnar  2x10 R sitting  ^1/27         Isometrics     Chin tuck 10\"hx5 Supine towel under occiput  Start 1/20    Scap squeezes   1/27 Progress to rows   shrugs     Cervical Flexion      Cervical Extension     Cervical sidebending               PRE's     External Rotation Sidelying R 1# 1x10  Start 1/27 cues to decrease elbow flexion and to maintain scap retraction and depression   Wall Y's 1x10  Start 1/27 cue to decrease shoulder elevation and increase shoulder flexion angle    Internal Rotation     Serratus     Biceps     Triceps     Shrugs     Horizontal Abd with ER     Reverse Flys     EXT     Flexion     Abduction          Cable Column/Theraband     Rows 1x10 Red TB  Start 1/27 cues decrease elbow flexion and proper scapular activation    Ext  1x10 Red TB  Start 1/27 cues for proper scapular activation    Scapular Retraction     External Rotation     Internal Rotation     Ext     TRIC     Lats     Shrugs     Flex     BIC     PNF          Posture  Posture awareness and education while sitting for work  Cues to incorporate chin tuck and scap retraction while completing computer work                    Manual Intervention  10\" total 1/27   GHJ mobilization R Ant-post Grade 1-3 x2' at 90 deg abd   Sup-inf Grade 1/3 x2' at 90 deg abd  1/27    Suboccipital release  30\"hx2 1/27   Cervical distraction  30\"hx2 1/27   Lateral glides  C2-C7 L->R Grade 1-3  1/27      Therapeutic Exercise and NMR EXR [x] (15426) Provided verbal/tactile cueing for activities related to strengthening, flexibility, endurance, ROM  for improvements in cervical, postural, scapular, scapulothoracic and UE control with self care, reaching, carrying, lifting, house/yardwork, driving/computer work.    [] (36224) Provided verbal/tactile cueing for activities related to improving balance, coordination, kinesthetic sense, posture, motor skill, proprioception  to assist with cervical, scapular, scapulothoracic and UE control with self care, reaching, carrying, lifting, house/yardwork, driving/computer work. Therapeutic Activities:    [] (39858 or 83889) Provided verbal/tactile cueing for activities related to improving balance, coordination, kinesthetic sense, posture, motor skill, proprioception and motor activation to allow for proper function of cervical, scapular, scapulothoracic and UE control with self care, carrying, lifting, driving/computer work.      Home Exercise Program:    [x] (08439) Reviewed/Progressed HEP activities related to strengthening, flexibility, endurance, ROM of cervical, scapular, scapulothoracic and UE control with self care, reaching, carrying, lifting, house/yardwork, driving/computer work  [] (68378) Reviewed/Progressed HEP activities related to improving balance, coordination, kinesthetic sense, posture, motor skill, proprioception of cervical, scapular, scapulothoracic and UE control with self care, reaching, carrying, lifting, house/yardwork, driving/computer work      Manual Treatments:  PROM / STM / Oscillations-Mobs:  G-I, II, III, IV (PA's, Inf., Post.) [] (77977) Provided manual therapy to mobilize soft tissue/joints of cervical/CT, scapular GHJ and UE for the purpose of decreasing headache, modulating pain, promoting relaxation,  increasing ROM, reducing/eliminating soft tissue swelling/inflammation/restriction, improving soft tissue extensibility and allowing for proper ROM for normal function with self care, reaching, carrying, lifting, house/yardwork, driving/computer work    Modalities:  N/A 1/27/2021    Charges:  Timed Code Treatment Minutes: 46'   Total Treatment Minutes: 11:35-12:29  54'       [] EVAL (LOW) 48093 (typically 20 minutes face-to-face)  [] EVAL (MOD) 92588 (typically 30 minutes face-to-face)  [] EVAL (HIGH) 08646 (typically 45 minutes face-to-face)  [] RE-EVAL     [x] HL(49337) x   2  [] IONTO  [] NMR (12480) x     [] VASO  [x] Manual (26122) x 1     [] Other:  [] TA x      [] Mech Traction (70099)  [] ES(attended) (86588)      [] ES (un) (33349):     GOALS:  Patient stated goal: To be proactive in order to decrease pain & tingling sensation in hands when performing house chores and to strengthen the neck and shoulder. [] Progressing: [] Met: [] Not Met: [] Adjusted    Therapist goals for Patient:   Short Term Goals: To be achieved in: 2 weeks  1. Independent in HEP and progression per patient tolerance, in order to prevent re-injury. [] Progressing: [] Met: [] Not Met: [] Adjusted   2. Patient will have a decrease in pain to facilitate improvement in movement, function, and ADLs as indicated by Functional Deficits. [] Progressing: [] Met: [] Not Met: [] Adjusted    Long Term Goals: To be achieved in: 4 weeks  1. Disability index score of 10% or less for the UEFI to assist with reaching prior level of function. [] Progressing: [] Met: [] Not Met: [] Adjusted  2. Patient will demonstrate increased AROM and PROM of the R GHJ to equal the L GHJ to allow for proper joint functioning as indicated by patients Functional Deficits. [] Progressing: [] Met: [] Not Met: [] Adjusted  3. Patient will demonstrate increase strength of shoulder and cervical musculature to 4+/5 in order to allow for proper functional mobility as indicated by patient's Functional Deficits. [] Progressing: [] Met: [] Not Met: [] Adjusted  4. Patient will return to all functional activities without increased symptoms or restriction. [] Progressing: [] Met: [] Not Met: [] Adjusted  5. Patient will be able to participate in her normal workout regimen fully without increased symptoms or restriction. [] Progressing: [] Met: [] Not Met: [] Adjusted       Overall Progression Towards Functional goals/ Treatment Progress Update:  [] Patient is progressing as expected towards functional goals listed. [] Progression is slowed due to complexities/Impairments listed. [] Progression has been slowed due to co-morbidities. [x] Plan just implemented, too soon to assess goals progression <30days   [] Goals require adjustment due to lack of progress  [] Patient is not progressing as expected and requires additional follow up with physician  [] Other    Prognosis for POC: [x] Good [] Fair  [] Poor      Patient requires continued skilled intervention: [x] Yes  [] No    Treatment/Activity Tolerance:  [x] Patient able to complete treatment  [] Patient limited by fatigue  [] Patient limited by pain     [] Patient limited by other medical complications  [] Other:   1/27: Pt required cues to decrease elbow flexion and to maintain scap retraction and depression during sidelying ER, to decrease shoulder elevation and increase shoulder flexion angle during Wall Y's, for proper scapular activation for Rows and Ext and cues to decrease elbow flexion during Rows. At the end of treatment, pt stated she felt good and a lot looser than when she entered, and her tingling sensation has decreased.                  Patient Education: 1/27: Pt educated on POC and progression of exercises due to limited visits because of financial limitations. Pt educated on what to feel and not feel for each exercise and to notify if she has any questions or concerns, or if she regresses with her progress. Educated pt to incorporate chin tuck and scap retraction while completing computer work. 1/20: Educated pt on deficits, POC, posture awareness, and importance of HEP. PLAN: See eval  [] Continue per plan of care [] Alter current plan (see comments above)  [x] Plan of care initiated [] Hold pending MD visit [] Discharge  1/27: Recommended for pt to be seen 1x/week for two more weeks. However, Pt wishes to try to complete program independently. Pt's POC will be open for 30 days and will be discharged if she does not make further appointments. 1/20: Recommended to see pt 2x/week for 4 weeks, however due to financial limitations, will plan on seeing pt 1x/week for 4 weeks. Electronically signed by:  Francisca Yates, PT  Lisa Grijalva, Student Physical Therapist  Therapist was present, directed the patient's care, made skilled judgement, and was responsible for assessment and treatment of the patient. Note: If patient does not return for scheduled/ recommended follow up visits, this note will serve as a discharge from care along with most recent update on progress.

## 2021-03-03 ENCOUNTER — OFFICE VISIT (OUTPATIENT)
Dept: PRIMARY CARE CLINIC | Age: 61
End: 2021-03-03
Payer: COMMERCIAL

## 2021-03-03 DIAGNOSIS — Z20.822 SUSPECTED COVID-19 VIRUS INFECTION: Primary | ICD-10-CM

## 2021-03-03 LAB — SARS-COV-2: NOT DETECTED

## 2021-03-03 PROCEDURE — 99211 OFF/OP EST MAY X REQ PHY/QHP: CPT | Performed by: NURSE PRACTITIONER

## 2021-03-04 ENCOUNTER — TELEPHONE (OUTPATIENT)
Dept: PRIMARY CARE CLINIC | Age: 61
End: 2021-03-04

## 2021-03-04 NOTE — TELEPHONE ENCOUNTER
The throat swabs are very inaccurate. I would insist on a nasal swab, make sure she has no covid, and then she can get the vaccine.

## 2021-03-04 NOTE — TELEPHONE ENCOUNTER
Patient is having symptoms for sore throat,cough,congestion and covid test was negative how long she have to wait to get covid vaccine.

## 2021-03-04 NOTE — TELEPHONE ENCOUNTER
----- Message from Thalia Tang sent at 3/4/2021  9:30 AM EST -----  Subject: Results Request    QUESTIONS  Which lab or imaging result is the patient calling about? Covid 19  Which provider ordered the test?   At what location was the test performed? Date the test was performed? 2021-03-03  Additional Information for Provider? Patient has a question about what the   test results say for her Covid test.   ---------------------------------------------------------------------------  --------------  CALL BACK INFO  What is the best way for the office to contact you? OK to leave message on   voicemail  Preferred Call Back Phone Number?  [phone]

## 2021-03-04 NOTE — TELEPHONE ENCOUNTER
Patient had a throat swab done at [facility].  Do you want her to do nasal?

## 2021-03-05 ENCOUNTER — OFFICE VISIT (OUTPATIENT)
Dept: PRIMARY CARE CLINIC | Age: 61
End: 2021-03-05
Payer: COMMERCIAL

## 2021-03-05 DIAGNOSIS — Z20.822 SUSPECTED COVID-19 VIRUS INFECTION: Primary | ICD-10-CM

## 2021-03-05 LAB — SARS-COV-2: NOT DETECTED

## 2021-03-05 PROCEDURE — 99211 OFF/OP EST MAY X REQ PHY/QHP: CPT | Performed by: NURSE PRACTITIONER

## 2021-03-05 NOTE — PROGRESS NOTES
Sukhdeep Mora received a viral test for COVID-19. They were educated on isolation and quarantine as appropriate. For any symptoms, they were directed to seek care from their PCP, given contact information to establish with a doctor, directed to an urgent care or the emergency room.

## 2021-05-19 ENCOUNTER — NURSE TRIAGE (OUTPATIENT)
Dept: OTHER | Facility: CLINIC | Age: 61
End: 2021-05-19

## 2021-05-19 NOTE — TELEPHONE ENCOUNTER
Reason for Disposition   [1] Chest pain(s) lasting a few seconds AND [2] persists > 3 days    Answer Assessment - Initial Assessment Questions  1. LOCATION: \"Where does it hurt? \"        For the first week it was just on the upper left breast about 4 inches below clavicle and 1-2 inches inward towards the sternum. Now the pain is just across the top, from left to right and about 4 inches below clavicle. 2. RADIATION: \"Does the pain go anywhere else? \" (e.g., into neck, jaw, arms, back)      Denies radiating to arms, neck , jaw and back    3. ONSET: \"When did the chest pain begin? \" (Minutes, hours or days)       4- and that exactly one week after my 2nd Pfizer vaccine. 4. PATTERN \"Does the pain come and go, or has it been constant since it started? \"  \"Does it get worse with exertion? \"       Comes and goes    5. DURATION: \"How long does it last\" (e.g., seconds, minutes, hours)      Just a couple of seconds    6. SEVERITY: \"How bad is the pain? \"  (e.g., Scale 1-10; mild, moderate, or severe)     - MILD (1-3): doesn't interfere with normal activities      - MODERATE (4-7): interferes with normal activities or awakens from sleep     - SEVERE (8-10): excruciating pain, unable to do any normal activities        3/10    7. CARDIAC RISK FACTORS: \"Do you have any history of heart problems or risk factors for heart disease? \" (e.g., angina, prior heart attack; diabetes, high blood pressure, high cholesterol, smoker, or strong family history of heart disease)      Father had heart disease, palpitations (has to cough to get heart back in rhythm)    8. PULMONARY RISK FACTORS: \"Do you have any history of lung disease? \"  (e.g., blood clots in lung, asthma, emphysema, birth control pills)      Denies blood clots, asthma. Does have wheezing at times, which is a new symptom    9. CAUSE: \"What do you think is causing the chest pain? \"      I do not know    10. OTHER SYMPTOMS: \"Do you have any other symptoms? \" (e.g.,

## 2021-05-20 ENCOUNTER — OFFICE VISIT (OUTPATIENT)
Dept: PRIMARY CARE CLINIC | Age: 61
End: 2021-05-20
Payer: COMMERCIAL

## 2021-05-20 VITALS
DIASTOLIC BLOOD PRESSURE: 68 MMHG | HEART RATE: 69 BPM | OXYGEN SATURATION: 99 % | BODY MASS INDEX: 20.07 KG/M2 | WEIGHT: 132 LBS | TEMPERATURE: 97.6 F | SYSTOLIC BLOOD PRESSURE: 100 MMHG | RESPIRATION RATE: 14 BRPM

## 2021-05-20 DIAGNOSIS — R07.9 CHEST PAIN, UNSPECIFIED TYPE: Primary | ICD-10-CM

## 2021-05-20 DIAGNOSIS — Z82.49 FAMILY HISTORY OF EARLY CAD: ICD-10-CM

## 2021-05-20 DIAGNOSIS — F41.9 ANXIETY: ICD-10-CM

## 2021-05-20 DIAGNOSIS — E03.9 ACQUIRED HYPOTHYROIDISM: Chronic | ICD-10-CM

## 2021-05-20 DIAGNOSIS — J30.1 SEASONAL ALLERGIC RHINITIS DUE TO POLLEN: ICD-10-CM

## 2021-05-20 PROCEDURE — 99214 OFFICE O/P EST MOD 30 MIN: CPT | Performed by: INTERNAL MEDICINE

## 2021-05-20 NOTE — ASSESSMENT & PLAN NOTE
On zyrtec,  Patient is compliant w medications, no side effects, effective, provides adequate symptom relief. No new symptoms or problems as noted by patient. The problem is stable, no changes noted by patient. Will consider monitoring labs and refill medications as appropriate. Patient counseled and will continue current plan.

## 2021-05-20 NOTE — PROGRESS NOTES
Subjective:      Patient ID: Tae Rivera is a 63 y.o. female. HPI  Established patient here for a visit to manage acute and chronic medical conditions as detailed below. Had some discomfort in the left side of the clavicle and upper part of the arm and chest radiates across following her second covid vaccine. Lasts for a couple of seconds and goes away, worse with arm movements of the arms. Still having it 5 weeks later,   Acquired hypothyroidism  On synthroid,  Patient is compliant w medications, no side effects, effective, provides adequate symptom relief. No new symptoms or problems as noted by patient. The problem is stable, no changes noted by patient. Will consider monitoring labs and refill medications as appropriate. Patient counseled and will continue current plan. Allergic rhinitis  On zyrtec,  Patient is compliant w medications, no side effects, effective, provides adequate symptom relief. No new symptoms or problems as noted by patient. The problem is stable, no changes noted by patient. Will consider monitoring labs and refill medications as appropriate. Patient counseled and will continue current plan. Anxiety  Stable,   Handling it well. Review of Systems  ROS: No unusual headaches or allergy symptoms or blurred vision. No prolonged cough. No flushing or facial pain or chest pain,dizziness, dyspnea, palpitations, or chest pain on exertion. No syncope. No nausea or vommitting or diarrhea. No jaundice or abdominal pain, change in bowel habits, black or bloody stools. No dysuria or hematuria or frequency of urination. No myalgias or muscle pain. No numbness, weakness, or tingling. No falls, or loss of consciousness. No weight loss or back pain. No falls. No paresthesias. No joint swelling or redness. No joint pain. No recent weight loss. No focal weakness or sensory deficits or paresthesias, No confusion or altered sensorium. No hematemesis. No hearing loss.  No hever. All other systems were reviewed, and review was negative. Objective:   Physical Exam  /68 (Site: Left Upper Arm, Position: Sitting, Cuff Size: Medium Adult)   Pulse 69   Temp 97.6 °F (36.4 °C) (Skin)   Resp 14   Wt 132 lb (59.9 kg)   LMP 02/26/2016   SpO2 99%   BMI 20.07 kg/m²    The physical exam reveals a patient who appears well, alert and oriented x 3, pleasant, cooperative. Vitals are as noted. Head is atraumatic and normocephalic. Eyes reveal normal conjunctiva, cornea normal, pupils are equal and rective to light. Nasal mucosa is normal. Throat is normal without exudates. Ears reveal normal tympanic membranes. Neck is supple and free of adenopathy, or masses. No thyromegaly. No jugular venous distension. Lungs are clear to auscultation, no rales or rhonchi noted. Heart sounds are regular , no murmurs, clicks, gallops or rubs. Abdomen is soft, no tenderness, masses or organomegaly. Bowel sounds are normally heard. Pelvis: normal. Extremities are normal. Peripheral pulses are normal. Screening neurological exam is normal without focal findings. Cranial nerves are intact, reflexes are symmetrical and muscle strength eaqual. Skin is normal without suspicious lesions noted. Assessment:      Chest pain  Family hx of CAD. Acquired hypothyroidism  On synthroid,  Patient is compliant w medications, no side effects, effective, provides adequate symptom relief. No new symptoms or problems as noted by patient. The problem is stable, no changes noted by patient. Will consider monitoring labs and refill medications as appropriate. Patient counseled and will continue current plan. Allergic rhinitis  On zyrtec,  Patient is compliant w medications, no side effects, effective, provides adequate symptom relief. No new symptoms or problems as noted by patient. The problem is stable, no changes noted by patient. Will consider monitoring labs and refill medications as appropriate.  Patient counseled and will continue current plan. Anxiety  Stable,   Handling it well. Plan: Will get a stress test.   Labs ordered, reviewed. Medications refilled. All Health maintenance needs reviewed and the needful ordered.            Jossie Jade MD

## 2021-06-02 ENCOUNTER — HOSPITAL ENCOUNTER (OUTPATIENT)
Dept: NON INVASIVE DIAGNOSTICS | Age: 61
Discharge: HOME | End: 2021-06-02
Payer: COMMERCIAL

## 2021-06-02 LAB
LV EF: 78 %
LVEF MODALITY: NORMAL

## 2021-06-02 PROCEDURE — A9502 TC99M TETROFOSMIN: HCPCS | Performed by: INTERNAL MEDICINE

## 2021-06-02 PROCEDURE — 2580000003 HC RX 258: Performed by: INTERNAL MEDICINE

## 2021-06-02 PROCEDURE — 93017 CV STRESS TEST TRACING ONLY: CPT

## 2021-06-02 PROCEDURE — 78452 HT MUSCLE IMAGE SPECT MULT: CPT

## 2021-06-02 PROCEDURE — 3430000000 HC RX DIAGNOSTIC RADIOPHARMACEUTICAL: Performed by: INTERNAL MEDICINE

## 2021-06-02 RX ORDER — SODIUM CHLORIDE 0.9 % (FLUSH) 0.9 %
10 SYRINGE (ML) INJECTION PRN
Status: DISCONTINUED | OUTPATIENT
Start: 2021-06-02 | End: 2021-06-03 | Stop reason: HOSPADM

## 2021-06-02 RX ADMIN — Medication 10 ML: at 15:01

## 2021-06-02 RX ADMIN — TETROFOSMIN 10 MILLICURIE: 1.38 INJECTION, POWDER, LYOPHILIZED, FOR SOLUTION INTRAVENOUS at 13:40

## 2021-06-02 RX ADMIN — Medication 10 ML: at 13:40

## 2021-06-02 RX ADMIN — TETROFOSMIN 30.9 MILLICURIE: 1.38 INJECTION, POWDER, LYOPHILIZED, FOR SOLUTION INTRAVENOUS at 15:01

## 2021-10-11 ENCOUNTER — E-VISIT (OUTPATIENT)
Dept: PRIMARY CARE CLINIC | Age: 61
End: 2021-10-11
Payer: COMMERCIAL

## 2021-10-11 DIAGNOSIS — T63.441A BEE STING REACTION, ACCIDENTAL OR UNINTENTIONAL, INITIAL ENCOUNTER: Primary | ICD-10-CM

## 2021-10-11 PROCEDURE — 99422 OL DIG E/M SVC 11-20 MIN: CPT | Performed by: INTERNAL MEDICINE

## 2021-10-11 RX ORDER — METHYLPREDNISOLONE 4 MG/1
TABLET ORAL
Qty: 1 KIT | Refills: 0 | Status: SHIPPED | OUTPATIENT
Start: 2021-10-11 | End: 2022-01-06 | Stop reason: ALTCHOICE

## 2021-11-08 ENCOUNTER — TELEPHONE (OUTPATIENT)
Dept: PRIMARY CARE CLINIC | Age: 61
End: 2021-11-08

## 2021-11-08 NOTE — TELEPHONE ENCOUNTER
[facility] spoke with larissa submit  authorization and last ov visit has been faxed to [phone] waiting for authorization.

## 2021-11-08 NOTE — TELEPHONE ENCOUNTER
Needs a PA done for the stress test that was done on 6/2/21. [facility] denied the test because the PA wasn't done. Please do a backdated PA for this. This is urgent because this pt has been working on this with the insurance for months. Please call pt when this has been done. Her phone number is [phone].     claim [id number]

## 2021-12-09 ENCOUNTER — OFFICE VISIT (OUTPATIENT)
Dept: ORTHOPEDIC SURGERY | Age: 61
End: 2021-12-09
Payer: COMMERCIAL

## 2021-12-09 VITALS — WEIGHT: 132.06 LBS | HEIGHT: 68 IN | BODY MASS INDEX: 20.01 KG/M2

## 2021-12-09 DIAGNOSIS — M25.562 CHRONIC PAIN OF LEFT KNEE: ICD-10-CM

## 2021-12-09 DIAGNOSIS — M25.562 LEFT KNEE PAIN, UNSPECIFIED CHRONICITY: Primary | ICD-10-CM

## 2021-12-09 DIAGNOSIS — M17.12 OSTEOARTHRITIS OF LEFT PATELLOFEMORAL JOINT: ICD-10-CM

## 2021-12-09 DIAGNOSIS — G89.29 CHRONIC PAIN OF LEFT KNEE: ICD-10-CM

## 2021-12-09 PROCEDURE — L1820 KO ELAS W/ CONDYLE PADS & JO: HCPCS | Performed by: INTERNAL MEDICINE

## 2021-12-09 PROCEDURE — 99214 OFFICE O/P EST MOD 30 MIN: CPT | Performed by: INTERNAL MEDICINE

## 2021-12-09 RX ORDER — MELOXICAM 15 MG/1
15 TABLET ORAL DAILY
Qty: 30 TABLET | Refills: 1 | Status: SHIPPED | OUTPATIENT
Start: 2021-12-09

## 2021-12-09 NOTE — PROGRESS NOTES
Chief Complaint:   Chief Complaint   Patient presents with    Knee Pain     NP left knee pain x 1 year. No injury, Knee feels unstable. History of Present Illness:       Patient is a 63 y.o. female presents with the above complaint. The symptoms began 1 plus yearsago and started without an injury. The patient describes a sharp, aching pain that does not radiate. The symptoms are intermittent  and are are worsening since the onset. Pain localizes to the front side of the knee and  does seems to follow a typical patella femoral provacative pattern. There are not mechanical symptoms that are active at this time. The patient admits to subjective instability about the knee and denies new onset weakness of the lower extremity. Pain level 6    The patient denies a pattern of activity related swelling. Treatment to date: none. There is no prior history of knee trauma. There is no prior history of autoimmune disease, inflammatory arthropathy, or crystal arthropathy. Past Medical History:        Past Medical History:   Diagnosis Date    Acquired hypothyroidism     Allergic rhinitis, cause unspecified     Anxiety state, unspecified     Chronic lymphocytic thyroiditis     Colon polyp 2019    Diverticulitis of colon (without mention of hemorrhage)(562.11)     ? diagnosis; no diverticula noted on colonoscopy    Herpes simplex without mention of complication     Hormone replacement therapy (postmenopausal)     Mechelle Schrader    Hypothyroidism     Left thyroid nodule 2017    repeat thyroid sonogram in Dec, 2017.     Nontoxic uninodular goiter     Osteopenia after menopause 2018    spine    Rosacea     Unspecified glaucoma(365.9)     Unspecified inflammatory disease of uterus          Past Surgical History:   Procedure Laterality Date    COLONOSCOPY  2019    Repeat in 2024 - 5 yrs    LAPAROSCOPY      ovarian cysts, multiple laparoscopies    ARTURO AND BSO  02/26/2016     Lakisha         Present Medications:         Current Outpatient Medications   Medication Sig Dispense Refill    meloxicam (MOBIC) 15 MG tablet Take 1 tablet by mouth daily 30 tablet 1    methylPREDNISolone (MEDROL DOSEPACK) 4 MG tablet Take by mouth. 1 kit 0    cyclobenzaprine (FLEXERIL) 10 MG tablet Take 1 tablet by mouth nightly as needed for Muscle spasms (Patient not taking: Reported on 1/13/2021) 30 tablet 1    Cetirizine HCl (ZYRTEC ALLERGY PO) Take by mouth daily      valACYclovir (VALTREX) 500 MG tablet Take 1 tablet by mouth 2 times daily as needed (herpes outbreak) 60 tablet 1    estradiol (ESTRACE) 0.1 MG/GM vaginal cream INSERT 1 GRAM VAGINALLY WITH APPLICATOR THREE TIMES WEEKLY  6    SYNTHROID 88 MCG tablet Take 88 mcg by mouth daily      calcium carbonate-vitamin D (CALTRATE) 600-400 MG-UNIT TABS per tab Take 1 tablet by mouth. No current facility-administered medications for this visit. Allergies:         Allergies   Allergen Reactions    Dextromethorphan Swelling    Ciprofloxacin Rash    Codeine     Penicillins     Sulfa Antibiotics     Morphine And Related Rash        Social History:         Social History     Socioeconomic History    Marital status:      Spouse name: Not on file    Number of children: Not on file    Years of education: Not on file    Highest education level: Not on file   Occupational History    Not on file   Tobacco Use    Smoking status: Never Smoker    Smokeless tobacco: Never Used   Substance and Sexual Activity    Alcohol use: No    Drug use: No    Sexual activity: Yes   Other Topics Concern    Not on file   Social History Narrative    Not on file     Social Determinants of Health     Financial Resource Strain: Low Risk     Difficulty of Paying Living Expenses: Not very hard   Food Insecurity: No Food Insecurity    Worried About Running Out of Food in the Last Year: Never true    Merrill of Food in the Last Year: Never true Transportation Needs: No Transportation Needs    Lack of Transportation (Medical): No    Lack of Transportation (Non-Medical): No   Physical Activity:     Days of Exercise per Week: Not on file    Minutes of Exercise per Session: Not on file   Stress:     Feeling of Stress : Not on file   Social Connections:     Frequency of Communication with Friends and Family: Not on file    Frequency of Social Gatherings with Friends and Family: Not on file    Attends [employer] Services: Not on file    Active Member of [address] [place] [address] or Organizations: Not on file    Attends Club or Organization Meetings: Not on file    Marital Status: Not on file   Intimate Partner Violence:     Fear of Current or Ex-Partner: Not on file    Emotionally Abused: Not on file    Physically Abused: Not on file    Sexually Abused: Not on file   Housing Stability:     Unable to Pay for Housing in the Last Year: Not on file    Number of [place] in the Last Year: Not on file    Unstable Housing in the Last Year: Not on file        Review of Symptoms:    Pertinent items are noted in HPI    Review of systems reviewed from Patient History Form dated on today's date and   available in the patient's chart under the Media tab. Vital Signs: There were no vitals filed for this visit. General Exam:     Constitutional: Patient is adequately groomed with no evidence of malnutrition  Mental Status: The patient is oriented to time, place and person. The patient's mood and affect are appropriate. Vascular: Examination reveals no swelling or calf tenderness. Peripheral pulses are palpable and 2+.     Lymphatics: no lymphadenopathy of the inguinal region or lower extremity      Physical Exam: left knee      Primary Exam:    Inspection: There is increased Q angle, no atrophy or effusion      Palpation: No focal tenderness, mild crepitation patellofemoral bilateral      Range of Motion: 0/130      Strength: Normal with SLR      Special Tests: Lachman test negative, collateral ligament stressing stable, anterior/posterior drawer negative, medial and lateral [facility] testing negative, patella femoral provacative Positive      Skin: There are no rashes, ulcerations or lesions. Gait: Nonantalgic      Reflex intact lower     Additional Comments:        Additional Examinations:           Right Lower Extremity: Examination of the right lower extremity does not show any tenderness, deformity or injury. Range of motion is unremarkable. There is no gross instability. There are no rashes, ulcerations or lesions.   Strength and tone are normal.         Office Imaging Results/Procedures PerformedToday:      Radiology:      X-rays obtained and reviewed in office:   Views 4 views left knee comparison view standing/merchant/roseneberg right knee   Location left knee   Impression patellofemoral joint is anatomic tibiofemoral joints demonstrate grade 1 degenerative change bilaterally merchant projection demonstrates grade 1-2 degenerative change subtle articular spurring lateral patellofemoral articulation no other soft tissue or osseous abnormalities       Office Procedures:     Orders Placed This Encounter   Procedures    XR KNEE LEFT (MIN 4 VIEWS)     Standing Status:   Future     Number of Occurrences:   1     Standing Expiration Date:   12/9/2022     Order Specific Question:   Reason for exam:     Answer:   knee pain    XR KNEE RIGHT (3 VIEWS)     Standing Status:   Future     Number of Occurrences:   1     Standing Expiration Date:   12/9/2022     Order Specific Question:   Reason for exam:     Answer:   knee pain    MRI KNEE LEFT WO CONTRAST     [facility] [place], please call pt to schedule, Axel Burr [facility]  Pt advised to f/u in clinic 2-3 days after MRI for results     Standing Status:   Future     Standing Expiration Date:   12/9/2022     Order Specific Question:   Reason for exam:     Answer:   r/o OCD patella, MMT    Breg Freerunner Knee Brace     Patient was prescribed a Breg Freerunner knee brace. The left knee will require stabilization / immobilization from this semi-rigid / rigid orthosis to improve their function. The orthosis will assist in protecting the affected area, provide functional support and facilitate healing. The patient was educated and fit by a healthcare professional with expert knowledge and specialization in brace application while under the direct supervision of the physician. Verbal and written instructions for the use of and application of this item were provided. They were instructed to contact the office immediately should the brace result in increased pain, decreased sensation, increased swelling or worsening of the condition. Other Outside Imaging and Testing Personally Reviewed:    XR KNEE RIGHT (3 VIEWS)    Result Date: 12/9/2021  Radiology exam is complete. No Radiologist dictation. Please follow up with ordering provider. XR KNEE LEFT (MIN 4 VIEWS)    Result Date: 12/9/2021  Radiology exam is complete. No Radiologist dictation. Please follow up with ordering provider. Assessment   Impression: . Encounter Diagnoses   Name Primary?  Osteoarthritis of left patellofemoral joint     Chronic pain of left knee     Left knee pain, unspecified chronicity Yes              Plan:     MRI evaluation left knee evaluate severity of patellofemoral joint chondropathy evaluate for high-grade osteochondral lesion, evaluate for high-grade medial or lateral meniscal pathology  Activity modification PPP  Functional patellofemoral bracing trial and meloxicam as needed with GI precaution  Home exercise patella femoral program  Ultrasound-guided intra-articular steroid injection only for escalating pain levels    The nature of the finding, probable diagnosis and likely treatment was thoroughly discussed with the patient.  The options, risks, complications, alternative treatment as well as some of the differential diagnosis was discussed. The patient was thoroughly informed and all questions were answered. the patient indicated understanding and satisfaction with the discussion. Orders:        Orders Placed This Encounter   Procedures    XR KNEE LEFT (MIN 4 VIEWS)     Standing Status:   Future     Number of Occurrences:   1     Standing Expiration Date:   12/9/2022     Order Specific Question:   Reason for exam:     Answer:   knee pain    XR KNEE RIGHT (3 VIEWS)     Standing Status:   Future     Number of Occurrences:   1     Standing Expiration Date:   12/9/2022     Order Specific Question:   Reason for exam:     Answer:   knee pain    MRI KNEE LEFT WO CONTRAST     [facility], please call pt to schedule, Axel Bur[facility]  Pt advised to f/u in clinic 2-3 days after MRI for results     Standing Status:   Future     Standing Expiration Date:   12/9/2022     Order Specific Question:   Reason for exam:     Answer:   r/o OCD patella, MMT    Breg Freerunner Knee Brace     Patient was prescribed a Breg Freerunner knee brace. The left knee will require stabilization / immobilization from this semi-rigid / rigid orthosis to improve their function. The orthosis will assist in protecting the affected area, provide functional support and facilitate healing. The patient was educated and fit by a healthcare professional with expert knowledge and specialization in brace application while under the direct supervision of the physician. Verbal and written instructions for the use of and application of this item were provided. They were instructed to contact the office immediately should the brace result in increased pain, decreased sensation, increased swelling or worsening of the condition. Disclaimer: \"This note was dictated with voice recognition software.  Though review and correction are routine, we apologize for any errors. \"

## 2022-01-03 ENCOUNTER — TELEPHONE (OUTPATIENT)
Dept: PRIMARY CARE CLINIC | Age: 62
End: 2022-01-03

## 2022-01-03 DIAGNOSIS — Z11.52 ENCOUNTER FOR SCREENING FOR COVID-19: Primary | ICD-10-CM

## 2022-01-03 NOTE — TELEPHONE ENCOUNTER
Patient called in and stated that insurance is not covering stress test from 6.2.21, order does not have diagnosis code on it, please add diagnosis code to order and contact insurance

## 2022-01-04 DIAGNOSIS — Z11.52 ENCOUNTER FOR SCREENING FOR COVID-19: ICD-10-CM

## 2022-01-05 ENCOUNTER — TELEPHONE (OUTPATIENT)
Dept: ORTHOPEDIC SURGERY | Age: 62
End: 2022-01-05

## 2022-01-05 LAB — SARS-COV-2: NOT DETECTED

## 2022-01-05 NOTE — TELEPHONE ENCOUNTER
Spoke to pt, will switch to VV. Gave instructions over the phone and will email instructions as well.  Pt v/u

## 2022-01-05 NOTE — TELEPHONE ENCOUNTER
General Question     Subject: COVID  Patient and /or Facility Request: Stepan Higginssilvia \"Monica\"  Contact Number: [phone]    PATIENT CALLING REGARDING THAT HER  TESTED POSITIVE FOR COVID ON Monday, January 3, 2021    PATIENT WAS TESTED YESTERDAY, AND SHE TESTED NEGATIVE. PATIENT WOULD LIKE TO KNOW, SINCE THEY STAY IN THE SAME HOME, IF SHE NEEDS TO CANCEL APPOINTMENT THAT'S SCHEDULE FOR TODAY, Wednesday, January 5, 2021 @ 3:30. PLEASE CALL BACK PATIENT AT THE ABOVE NUMBER.

## 2022-01-06 ENCOUNTER — TELEMEDICINE (OUTPATIENT)
Dept: ORTHOPEDIC SURGERY | Age: 62
End: 2022-01-06
Payer: COMMERCIAL

## 2022-01-06 DIAGNOSIS — Q74.1 DYSPLASIA OF TROCHLEA OF FEMUR: ICD-10-CM

## 2022-01-06 DIAGNOSIS — M17.12 OSTEOARTHRITIS OF LEFT PATELLOFEMORAL JOINT: Primary | ICD-10-CM

## 2022-01-06 PROCEDURE — 99214 OFFICE O/P EST MOD 30 MIN: CPT | Performed by: INTERNAL MEDICINE

## 2022-01-06 NOTE — PROGRESS NOTES
Chief Complaint:   Chief Complaint   Patient presents with    Knee Pain     Left knee symptoms are stable MRI completed recently          History of Present Illness:     TELEMEDICINE VISIT- This telecommunication was held between my office location and the patient's home. The anatomic location of interest was visible to me during this encounter. Services were provided through a synchronous discussion over Video chat to substitute for in-person clinic visit, and coded as such. Zora Daniels is a 63 y.o. female evaluated via telehelath on 1/6/2022. Consent:  She and/or health care decision maker is aware that that she may receive a bill for this telephone service, depending on her insurance coverage, and has provided verbal consent to proceed: Yes      Patient is a 63 y.o. female returns follow up for the above complaint. The patient was last seen approximately 1 monthsago. The symptoms show no change since the last visit. The patient has had further testing for the problem. Symptoms are low-grade no new injuries no new events. MRI completed in the interim  She denies any mechanical symptoms or mechanical symptoms. No pattern of active related swelling. She denies any mechanical symptoms she denies any subjective instability. No new injuries no new events           Past Medical History:        Past Medical History:   Diagnosis Date    Acquired hypothyroidism     Allergic rhinitis, cause unspecified     Anxiety state, unspecified     Chronic lymphocytic thyroiditis     Colon polyp 2019    Diverticulitis of colon (without mention of hemorrhage)(562.11)     ? diagnosis; no diverticula noted on colonoscopy    Herpes simplex without mention of complication     Hormone replacement therapy (postmenopausal)     Mechelle Hicks    Hypothyroidism     Left thyroid nodule 2017    repeat thyroid sonogram in Dec, 2017.     Nontoxic uninodular goiter     Osteopenia after menopause 2018 spine    Rosacea     Unspecified glaucoma(365.9)     Unspecified inflammatory disease of uterus         Present Medications:         Current Outpatient Medications   Medication Sig Dispense Refill    meloxicam (MOBIC) 15 MG tablet Take 1 tablet by mouth daily 30 tablet 1    methylPREDNISolone (MEDROL DOSEPACK) 4 MG tablet Take by mouth. 1 kit 0    cyclobenzaprine (FLEXERIL) 10 MG tablet Take 1 tablet by mouth nightly as needed for Muscle spasms (Patient not taking: Reported on 1/13/2021) 30 tablet 1    Cetirizine HCl (ZYRTEC ALLERGY PO) Take by mouth daily      valACYclovir (VALTREX) 500 MG tablet Take 1 tablet by mouth 2 times daily as needed (herpes outbreak) 60 tablet 1    estradiol (ESTRACE) 0.1 MG/GM vaginal cream INSERT 1 GRAM VAGINALLY WITH APPLICATOR THREE TIMES WEEKLY  6    SYNTHROID 88 MCG tablet Take 88 mcg by mouth daily      calcium carbonate-vitamin D (CALTRATE) 600-400 MG-UNIT TABS per tab Take 1 tablet by mouth. No current facility-administered medications for this visit. Allergies: Allergies   Allergen Reactions    Dextromethorphan Swelling    Ciprofloxacin Rash    Codeine     Penicillins     Sulfa Antibiotics     Morphine And Related Rash           Review of Systems:    Pertinent items are noted in HPI       Vital Signs: There were no vitals filed for this visit.      General Exam:        Physical Exam: left knee     Unable to assess in this form of telecommunication         Office Imaging Results/Procedures PerformedToday:             Office Procedures:     Orders Placed This Encounter   Procedures    Ambulatory referral to Physical Therapy     Referral Priority:   Routine     Referral Type:   Eval and Treat     Referral Reason:   Specialty Services Required     Number of Visits Requested:   1     Site: [facility] #: [id number] #: [id number] Procedure: MR Left Knee w/o Contrast ; Reason for Exam: DX: Left knee pain, OCD   This document is confidential medical information.  Unauthorized disclosure or use of this information is prohibited by law. If you are not the intended recipient of this document, please advise us by calling immediately [phone].       [facility]   null   [address], [address] [place] [address]           Patient Name: Yossi Slaughter   Case ID: [id number]   Patient : 1960   Referring Physician: Caty Colunga MD   Exam Date: 2021   Exam Description: MR Left Knee w/o Contrast            HISTORY:  Left knee pain and instability.  Osteochondral defect suspected.       TECHNICAL FACTORS:  Long- and short-axis fat- and water-weighted images were performed.       COMPARISON:  None.       FINDINGS:  The ACL and PCL are intact.       In the medial compartment, an enclosed, cleavage signal involving the posterior horn and body    of medial meniscus without macrotears.  No chondromalacia or osteoarthritis.  Intact MCL.       Lateral compartment without meniscal tears, chondromalacia or osteoarthritis.  Lateral    collateral ligament complex is intact.       Anterior compartment demonstrates a trochlear dysplasia, with a shallow Dejour type A trochlea.     Moderate osteoarthrosis and multifocal, penetrating chondral fissures throughout the patellar    vertical ridge, medial and lateral patellar articular facets associated with innumerable    subchondral arthropathic cysts and moderate confluent osteoedema.  The MPFL and lateral    patellar retinacula are intact.  No patellar dislocation or subluxation.       Nominal suprapatellar effusion.       The quadriceps and patellar tendons are normal.       Flexor mechanism and neurovascular bundle are unremarkable.       CONCLUSION:   1. No osteochondral defects.    2. Anterior compartment grade 4 chondromalacia with multifocal penetrating chondral fissures    along the vertical ridge of medial and lateral patellar articular facets associated with    innumerable subchondral arthropathic cysts and moderate confluent osteoedema.  Moderate    osteoarthrosis. 3. Trochlear dysplasia.                   Thank you for the opportunity to provide your interpretation.               Amy Gonzalez. En Oliver MD       A: FREDERICK/RILEY/bobby 12/22/2021 8:27 PM   T: BOBBY 12/22/2021 12:01 PM           Other Outside Imaging and Testing Personally Reviewed:    No results found. Assessment   Impression: . Encounter Diagnoses   Name Primary?  Osteoarthritis of left patellofemoral joint Yes    Dysplasia of trochlea of femur               Plan:     Continue functional patellofemoral bracing. Activity modification PPP  Formal course of PT-PPP  Hyaluronic acid therapy as a treatment option OTC NSAIDs as needed with GI precaution and/or Tylenol      Approximately 30 minutes was spent related to previewing pertinent medical documentation prior to the patient's visit along with counseling during the patient's visit with respect to treatment options inclusive of alternatives to treatment and the complications and risks related to those treatment options along with expectations of outcome related to those treatments and inclusive of time in the documentation and ordering of testing and treatment after the visit. Orders:        Orders Placed This Encounter   Procedures    Ambulatory referral to Physical Therapy     Referral Priority:   Routine     Referral Type:   Eval and Treat     Referral Reason:   Specialty Services Required     Number of Visits Requested:   1         Jakob Arreaga MD.      Tree Marques: \"This note was dictated with voice recognition software. Though review and correction are routine, we apologize for any errors. \"

## 2022-01-25 ENCOUNTER — HOSPITAL ENCOUNTER (OUTPATIENT)
Dept: PHYSICAL THERAPY | Age: 62
Setting detail: THERAPIES SERIES
Discharge: HOME | End: 2022-01-25

## 2022-11-02 ENCOUNTER — OFFICE VISIT (OUTPATIENT)
Dept: PRIMARY CARE CLINIC | Age: 62
End: 2022-11-02
Payer: COMMERCIAL

## 2022-11-02 VITALS
WEIGHT: 134 LBS | TEMPERATURE: 97.6 F | SYSTOLIC BLOOD PRESSURE: 100 MMHG | BODY MASS INDEX: 20.38 KG/M2 | RESPIRATION RATE: 14 BRPM | HEART RATE: 74 BPM | DIASTOLIC BLOOD PRESSURE: 70 MMHG | OXYGEN SATURATION: 99 %

## 2022-11-02 DIAGNOSIS — G44.89 OTHER HEADACHE SYNDROME: Chronic | ICD-10-CM

## 2022-11-02 DIAGNOSIS — Z23 NEEDS FLU SHOT: Primary | ICD-10-CM

## 2022-11-02 DIAGNOSIS — E03.9 ACQUIRED HYPOTHYROIDISM: Chronic | ICD-10-CM

## 2022-11-02 PROCEDURE — 90471 IMMUNIZATION ADMIN: CPT | Performed by: INTERNAL MEDICINE

## 2022-11-02 PROCEDURE — 90674 CCIIV4 VAC NO PRSV 0.5 ML IM: CPT | Performed by: INTERNAL MEDICINE

## 2022-11-02 PROCEDURE — 99213 OFFICE O/P EST LOW 20 MIN: CPT | Performed by: INTERNAL MEDICINE

## 2022-11-02 SDOH — ECONOMIC STABILITY: HOUSING INSECURITY: IN THE LAST 12 MONTHS, HOW MANY PLACES HAVE YOU LIVED?: 1

## 2022-11-02 SDOH — ECONOMIC STABILITY: TRANSPORTATION INSECURITY
IN THE PAST 12 MONTHS, HAS THE LACK OF TRANSPORTATION KEPT YOU FROM MEDICAL APPOINTMENTS OR FROM GETTING MEDICATIONS?: NO

## 2022-11-02 SDOH — ECONOMIC STABILITY: HOUSING INSECURITY
IN THE LAST 12 MONTHS, WAS THERE A TIME WHEN YOU DID NOT HAVE A STEADY PLACE TO SLEEP OR SLEPT IN A SHELTER (INCLUDING NOW)?: NO

## 2022-11-02 SDOH — HEALTH STABILITY: PHYSICAL HEALTH: ON AVERAGE, HOW MANY MINUTES DO YOU ENGAGE IN EXERCISE AT THIS LEVEL?: 40 MIN

## 2022-11-02 SDOH — ECONOMIC STABILITY: INCOME INSECURITY: IN THE LAST 12 MONTHS, WAS THERE A TIME WHEN YOU WERE NOT ABLE TO PAY THE MORTGAGE OR RENT ON TIME?: NO

## 2022-11-02 SDOH — ECONOMIC STABILITY: TRANSPORTATION INSECURITY
IN THE PAST 12 MONTHS, HAS LACK OF TRANSPORTATION KEPT YOU FROM MEETINGS, WORK, OR FROM GETTING THINGS NEEDED FOR DAILY LIVING?: NO

## 2022-11-02 SDOH — HEALTH STABILITY: PHYSICAL HEALTH: ON AVERAGE, HOW MANY DAYS PER WEEK DO YOU ENGAGE IN MODERATE TO STRENUOUS EXERCISE (LIKE A BRISK WALK)?: 4 DAYS

## 2022-11-02 SDOH — ECONOMIC STABILITY: FOOD INSECURITY: WITHIN THE PAST 12 MONTHS, THE FOOD YOU BOUGHT JUST DIDN'T LAST AND YOU DIDN'T HAVE MONEY TO GET MORE.: NEVER TRUE

## 2022-11-02 SDOH — ECONOMIC STABILITY: FOOD INSECURITY: WITHIN THE PAST 12 MONTHS, YOU WORRIED THAT YOUR FOOD WOULD RUN OUT BEFORE YOU GOT MONEY TO BUY MORE.: NEVER TRUE

## 2022-11-02 ASSESSMENT — PATIENT HEALTH QUESTIONNAIRE - PHQ9
SUM OF ALL RESPONSES TO PHQ QUESTIONS 1-9: 0
1. LITTLE INTEREST OR PLEASURE IN DOING THINGS: 0
SUM OF ALL RESPONSES TO PHQ9 QUESTIONS 1 & 2: 0
2. FEELING DOWN, DEPRESSED OR HOPELESS: 0

## 2022-11-02 ASSESSMENT — SOCIAL DETERMINANTS OF HEALTH (SDOH)
HOW OFTEN DO YOU ATTEND CHURCH OR RELIGIOUS SERVICES?: NEVER
HOW OFTEN DO YOU ATTENT MEETINGS OF THE CLUB OR ORGANIZATION YOU BELONG TO?: NEVER
HOW OFTEN DO YOU GET TOGETHER WITH FRIENDS OR RELATIVES?: THREE TIMES A WEEK
IN A TYPICAL WEEK, HOW MANY TIMES DO YOU TALK ON THE PHONE WITH FAMILY, FRIENDS, OR NEIGHBORS?: THREE TIMES A WEEK
HOW HARD IS IT FOR YOU TO PAY FOR THE VERY BASICS LIKE FOOD, HOUSING, MEDICAL CARE, AND HEATING?: NOT HARD AT ALL
DO YOU BELONG TO ANY CLUBS OR ORGANIZATIONS SUCH AS CHURCH GROUPS UNIONS, FRATERNAL OR ATHLETIC GROUPS, OR SCHOOL GROUPS?: NO

## 2022-11-02 ASSESSMENT — LIFESTYLE VARIABLES
HOW MANY STANDARD DRINKS CONTAINING ALCOHOL DO YOU HAVE ON A TYPICAL DAY: 1 OR 2
HOW OFTEN DO YOU HAVE A DRINK CONTAINING ALCOHOL: MONTHLY OR LESS

## 2022-11-02 NOTE — ASSESSMENT & PLAN NOTE
Has had a vague numbness and tingling feeling in the occipital area of the head on and off, for the last 2 years, lasts for a few mins and goes away,   No pain, no dizziness, no visual symptoms   The frequency is worsening,   No other paresthesias,   Unsure of the etiology,   Used to be when she was lying down, now happens other times as well. Will refer to neurology.

## 2022-11-02 NOTE — PROGRESS NOTES
Subjective:      Patient ID: Rita Lindsey is a 63 y.o. female. HPI  Established patient here for a visit to manage acute and chronic medical conditions as detailed below. Other headache syndrome  Has had a vague numbness and tingling feeling in the occipital area of the head on and off, for the last 2 years, lasts for a few mins and goes away,   No pain, no dizziness, no visual symptoms   The frequency is worsening,   No other paresthesias,   Unsure of the etiology,   Used to be when she was lying down, now happens other times as well. Will refer to neurology. Acquired hypothyroidism  On synthroid,  Patient is compliant w medications, no side effects, effective, provides adequate symptom relief. No new symptoms or problems as noted by patient. The problem is stable, no changes noted by patient. Will consider monitoring labs and refill medications as appropriate. Patient counseled and will continue current plan. Review of Systems  ROS: No unusual headaches or allergy symptoms or blurred vision. No prolonged cough. No flushing or facial pain or chest pain,dizziness, dyspnea, palpitations, or chest pain on exertion. No syncope. No nausea or vommitting or diarrhea. No jaundice or abdominal pain, change in bowel habits, black or bloody stools. No dysuria or hematuria or frequency of urination. No myalgias or muscle pain. No numbness, weakness, or tingling. No falls, or loss of consciousness. No weight loss or back pain. No falls. No paresthesias. No joint swelling or redness. No joint pain. No recent weight loss. No focal weakness or sensory deficits or paresthesias, No confusion or altered sensorium. No hematemesis. No hearing loss. No siezures. All other systems were reviewed, and review was negative.    Objective:   Physical Exam  /70 (Site: Left Upper Arm, Position: Sitting, Cuff Size: Medium Adult)   Pulse 74   Temp 97.6 °F (36.4 °C)   Resp 14   Wt 134 lb (60.8 kg)   LMP 02/26/2016   SpO2 99%   BMI 20.38 kg/m²    The physical exam reveals a patient who appears well, alert and oriented x 3, pleasant, cooperative. Vitals are as noted. Head is atraumatic and normocephalic. Eyes reveal normal conjunctiva, cornea normal, pupils are equal and rective to light. Nasal mucosa is normal. Throat is normal without exudates. Ears reveal normal tympanic membranes. Neck is supple and free of adenopathy, or masses. No thyromegaly. No jugular venous distension. Lungs are clear to auscultation, no rales or rhonchi noted. Heart sounds are regular , no murmurs, clicks, gallops or rubs. Abdomen is soft, no tenderness, masses or organomegaly. Bowel sounds are normally heard. Pelvis: normal. Extremities are normal. Peripheral pulses are normal. Screening neurological exam is normal without focal findings. Cranial nerves are intact, reflexes are symmetrical and muscle strength eaqual. Skin is normal without suspicious lesions noted. Assessment:      Other headache syndrome  Has had a vague numbness and tingling feeling in the occipital area of the head on and off, for the last 2 years, lasts for a few mins and goes away,   No pain, no dizziness, no visual symptoms   The frequency is worsening,   No other paresthesias,   Unsure of the etiology,   Used to be when she was lying down, now happens other times as well. Will refer to neurology. Acquired hypothyroidism  On synthroid,  Patient is compliant w medications, no side effects, effective, provides adequate symptom relief. No new symptoms or problems as noted by patient. The problem is stable, no changes noted by patient. Will consider monitoring labs and refill medications as appropriate. Patient counseled and will continue current plan. Plan:      As above, will refer to neurology.          Carol Marquez MD

## 2022-12-06 ENCOUNTER — TELEMEDICINE (OUTPATIENT)
Dept: PRIMARY CARE CLINIC | Age: 62
End: 2022-12-06
Payer: COMMERCIAL

## 2022-12-06 DIAGNOSIS — U07.1 COVID-19: Primary | ICD-10-CM

## 2022-12-06 PROCEDURE — 99213 OFFICE O/P EST LOW 20 MIN: CPT | Performed by: NURSE PRACTITIONER

## 2022-12-06 RX ORDER — BENZONATATE 200 MG/1
200 CAPSULE ORAL 3 TIMES DAILY PRN
Qty: 30 CAPSULE | Refills: 0 | Status: SHIPPED | OUTPATIENT
Start: 2022-12-06 | End: 2022-12-16

## 2022-12-06 NOTE — PROGRESS NOTES
2022    TELEHEALTH EVALUATION -- Audio/Visual (During XTWXI-66 public health emergency)    HPI:    Evan Cifuentes (:  1960) has requested an audio/video evaluation for the following concern(s):    Patient seen virtually for a sick visit    Onset:  with sore throat, runny nose  Covid+ this am.   This am felt much worse with sore throat  Has a bad cough. Not productive. No wheezing. Some shortness of breath with climbing steps. No fevers. Temp up to 99. Mild body aches, chills. Congestion and runny nose. Right ear pressure  No n/v/d. Taking Ibuprofen, vit c, zinc and zyrtec. Covid vaccines x 2    Review of Systems   All other systems reviewed and are negative. Prior to Visit Medications    Medication Sig Taking? Authorizing Provider   benzonatate (TESSALON) 200 MG capsule Take 1 capsule by mouth 3 times daily as needed for Cough Yes EDWARD Alexander CNP   nirmatrelvir/ritonavir (PAXLOVID) 20 x 150 MG & 10 x 100MG TBPK Take 3 tablets (two 150 mg nirmatrelvir and one 100 mg ritonavir tablets) by mouth every 12 hours for 5 days. Yes EDWARD Coleman CNP   meloxicam (MOBIC) 15 MG tablet Take 1 tablet by mouth daily  Екатерина Whipple MD   Cetirizine HCl (ZYRTEC ALLERGY PO) Take by mouth daily  Historical Provider, MD   valACYclovir (VALTREX) 500 MG tablet Take 1 tablet by mouth 2 times daily as needed (herpes outbreak)  Mesfin Apple MD   estradiol (ESTRACE) 0.1 MG/GM vaginal cream INSERT 1 GRAM VAGINALLY WITH APPLICATOR THREE TIMES WEEKLY  Historical Provider, MD   SYNTHROID 88 MCG tablet Take 88 mcg by mouth daily  Historical Provider, MD   calcium carbonate-vitamin D (CALTRATE) 600-400 MG-UNIT TABS per tab Take 1 tablet by mouth.   Historical Provider, MD       Social History     Tobacco Use    Smoking status: Never    Smokeless tobacco: Never   Substance Use Topics    Alcohol use: No    Drug use: No        Allergies   Allergen Reactions Dextromethorphan Swelling    Ciprofloxacin Rash    Codeine     Penicillins     Sulfa Antibiotics     Morphine And Related Rash   ,   Past Medical History:   Diagnosis Date    Acquired hypothyroidism     Allergic rhinitis, cause unspecified     Anxiety state, unspecified     Chronic lymphocytic thyroiditis     Colon polyp 2019    Diverticulitis of colon (without mention of hemorrhage)(562.11)     ? diagnosis; no diverticula noted on colonoscopy    Herpes simplex without mention of complication     Hormone replacement therapy (postmenopausal)     Mechelle Mendenhaller    Hypothyroidism     Left thyroid nodule 2017    repeat thyroid sonogram in Dec, 2017. Nontoxic uninodular goiter     Osteopenia after menopause 2018    spine    Rosacea     Unspecified glaucoma(365.9)     Unspecified inflammatory disease of uterus        PHYSICAL EXAMINATION:  [ INSTRUCTIONS:  \"[x]\" Indicates a positive item  \"[]\" Indicates a negative item  -- DELETE ALL ITEMS NOT EXAMINED]  Vital Signs: (As obtained by patient/caregiver or practitioner observation)    Blood pressure-  Heart rate-    Respiratory rate-    Temperature-  Pulse oximetry-     Constitutional: [x] Appears well-developed and well-nourished [x] No apparent distress      [] Abnormal-   Mental status  [x] Alert and awake  [x] Oriented to person/place/time [x]Able to follow commands      Eyes:  EOM    []  Normal  [] Abnormal-  Sclera  []  Normal  [] Abnormal -         Discharge []  None visible  [] Abnormal -    HENT:   [x] Normocephalic, atraumatic.   [] Abnormal   [x] Mouth/Throat: Mucous membranes are moist.     External Ears [] Normal  [] Abnormal-     Neck: [x] No visualized mass     Pulmonary/Chest: [x] Respiratory effort normal.  [x] No visualized signs of difficulty breathing or respiratory distress        [] Abnormal-      Musculoskeletal:   [] Normal gait with no signs of ataxia         [x] Normal range of motion of neck        [] Abnormal-       Neurological:        [x] No Facial Asymmetry (Cranial nerve 7 motor function) (limited exam to video visit)          [x] No gaze palsy        [] Abnormal-         Skin:        [x] No significant exanthematous lesions or discoloration noted on facial skin         [] Abnormal-            Psychiatric:       [x] Normal Affect [x] No Hallucinations        [] Abnormal-     Other pertinent observable physical exam findings-     ASSESSMENT/PLAN:  1. COVID-19  Discussed symptomatic control with otc meds  Discussed quarantine recommendations. Discussed antivirals in detail. Patient would like to start on paxlovid. Recommend to add mucinex. May alternate tylenol/motrin prn for fever/body aches/sore throat  Warm salt water gargles prn  Increase fluids, rest.   Will call if no improvement. - benzonatate (TESSALON) 200 MG capsule; Take 1 capsule by mouth 3 times daily as needed for Cough  Dispense: 30 capsule; Refill: 0  - nirmatrelvir/ritonavir (PAXLOVID) 20 x 150 MG & 10 x 100MG TBPK; Take 3 tablets (two 150 mg nirmatrelvir and one 100 mg ritonavir tablets) by mouth every 12 hours for 5 days. Dispense: 30 tablet; Refill: 0    Return if symptoms worsen or fail to improve. John Jefferson, was evaluated through a synchronous (real-time) audio-video encounter. The patient (or guardian if applicable) is aware that this is a billable service, which includes applicable co-pays. This Virtual Visit was conducted with patient's (and/or legal guardian's) consent. The visit was conducted pursuant to the emergency declaration under the [place], [address] [place] [address] authority and the [employer] and [employer] Act. Patient identification was verified, and a caregiver was present when appropriate. The patient was located at Home: [address] [address]. Provider was located at [facility] (Appt Dept): [address]  [address] [facility].        Total time spent on this encounter: Not billed by time    --Hermann Alonso, EDWARD - CNP on 12/6/2022 at 4:27 PM    An electronic signature was used to authenticate this note.

## 2022-12-23 ENCOUNTER — TELEMEDICINE (OUTPATIENT)
Dept: PRIMARY CARE CLINIC | Age: 62
End: 2022-12-23
Payer: COMMERCIAL

## 2022-12-23 DIAGNOSIS — R05.1 ACUTE COUGH: Primary | ICD-10-CM

## 2022-12-23 PROCEDURE — 99213 OFFICE O/P EST LOW 20 MIN: CPT | Performed by: NURSE PRACTITIONER

## 2022-12-23 RX ORDER — PREDNISONE 20 MG/1
20 TABLET ORAL 2 TIMES DAILY
Qty: 10 TABLET | Refills: 0 | Status: SHIPPED | OUTPATIENT
Start: 2022-12-23 | End: 2022-12-28

## 2022-12-23 RX ORDER — BENZONATATE 100 MG/1
100 CAPSULE ORAL 3 TIMES DAILY PRN
Qty: 30 CAPSULE | Refills: 0 | Status: SHIPPED | OUTPATIENT
Start: 2022-12-23 | End: 2022-12-30

## 2022-12-23 ASSESSMENT — ENCOUNTER SYMPTOMS
RHINORRHEA: 1
COUGH: 1

## 2022-12-23 NOTE — LETTER
I had the pleasure of seeing Purvi Pinedo today for a primary care virtualist video visit secondary to cough after covi,. I have provided the following recommendations: benzonatate, prednisone. I have included my note for your review and have asked the patient to follow up with you next week due to complaints with her prolonged covid symptoms. . If you have questions, please reach out via [employer] secure messaging by searching for the [facility] Primary Care Virtualists. Your communication will be answered promptly by the Virtualist on service for the day. Additionally, we would love your overall feedback on this visit. Please hit shift and click the following link to let us know if the Virtualist service met your expectations. [email or web address]      Electronically signed by EDWARD Kilgore on 12/23/22 at 5:52 PM EST.

## 2022-12-23 NOTE — PROGRESS NOTES
Cecy Mora (:  1960) is a Established patient, here for evaluation of the following:    ASSESSMENT/PLAN:  Below is the assessment and plan developed based on review of pertinent history, physical exam, labs, studies, and medications. 1. Acute cough  Cough drop, hot tea and honey  -     predniSONE (DELTASONE) 20 MG tablet; Take 1 tablet by mouth 2 times daily for 5 days, Disp-10 tablet, R-0Normal  -     benzonatate (TESSALON) 100 MG capsule; Take 1 capsule by mouth 3 times daily as needed for Cough, Disp-30 capsule, R-0Normal  No follow-ups on file. SUBJECTIVE/OBJECTIVE:  She had covid 3 weeks ago. She feels she has not gotten back to herself. She has a residual cough. It is deep but not productive. She denies sinus pain or pressure or PND. Her cough sounds tight. She is still very fatigued. She says her head feels funny, she described it as inflamed feeling. She needs to call PCP next week to address the problem in person if not resolved. She has tried nasacort, mucinex, sudafed, saline spray. Review of Systems   Constitutional:  Positive for fatigue. HENT:  Positive for rhinorrhea. Respiratory:  Positive for cough. Neurological:  Positive for headaches.         Mild     Patient-Reported Vitals 2022   Patient-Reported Weight 128   Patient-Reported Height 5'8\"   Patient-Reported Temperature 98.5         Physical Exam    [INSTRUCTIONS:  \"[x]\" Indicates a positive item  \"[]\" Indicates a negative item  -- DELETE ALL ITEMS NOT EXAMINED]    Constitutional: [x] Appears well-developed and well-nourished [x] No apparent distress      [] Abnormal -     Mental status: [x] Alert and awake  [x] Oriented to person/place/time [x] Able to follow commands    [] Abnormal -     Eyes:   EOM    [x]  Normal    [] Abnormal -   Sclera  [x]  Normal    [] Abnormal -          Discharge [x]  None visible   [] Abnormal -     HENT: [x] Normocephalic, atraumatic  [] Abnormal -   [x] Mouth/Throat: Mucous membranes are moist    External Ears [x] Normal  [] Abnormal -    Neck: [x] No visualized mass [] Abnormal -     Pulmonary/Chest: [x] Respiratory effort normal   [x] No visualized signs of difficulty breathing or respiratory distress        [] Abnormal -      Musculoskeletal:   [x] Normal gait with no signs of ataxia         [x] Normal range of motion of neck        [] Abnormal -     Neurological:        [x] No Facial Asymmetry (Cranial nerve 7 motor function) (limited exam due to video visit)          [x] No gaze palsy        [] Abnormal -          Skin:        [x] No significant exanthematous lesions or discoloration noted on facial skin         [] Abnormal -            Psychiatric:       [x] Normal Affect [] Abnormal -        [x] No Hallucinations    Other pertinent observable physical exam findings:-      On this date 12/23/2022 I have spent 20 minutes reviewing previous notes, test results and face to face (virtual) with the patient discussing the diagnosis and importance of compliance with the treatment plan as well as documenting on the day of the visit. Faustino Wan, was evaluated through a synchronous (real-time) audio-video encounter. The patient (or guardian if applicable) is aware that this is a billable service, which includes applicable co-pays. This Virtual Visit was conducted with patient's (and/or legal guardian's) consent. The visit was conducted pursuant to the emergency declaration under the [address] [place] [address] waiver authority and the [employer] and [employer] General Act. Patient identification was verified, and a caregiver was present when appropriate. The patient was located at home in a state where the provider was licensed to provide care.     --EDWARD Martins

## 2023-01-05 ENCOUNTER — TELEPHONE (OUTPATIENT)
Dept: PRIMARY CARE CLINIC | Age: 63
End: 2023-01-05

## 2023-01-05 NOTE — TELEPHONE ENCOUNTER
----- Message from Nicole Buckley sent at 1/4/2023  9:42 AM EST -----  Subject: Message to Provider    QUESTIONS  Information for Provider? patient stated that she had covid last month in   beginning of december. She is not feeling well at this moment, cough,   chest feels heavy, running nose, tired. She stated that her throat also   feels inflamed. She wonder if she should get a chest xray or what she   should do at this point. She is just not feeling yourself again.  ---------------------------------------------------------------------------  --------------  Page DELGADILLO  [phone]; OK to leave message on voicemail  ---------------------------------------------------------------------------  --------------  SCRIPT ANSWERS  Relationship to Patient?  Self

## 2023-01-23 ENCOUNTER — OFFICE VISIT (OUTPATIENT)
Dept: PRIMARY CARE CLINIC | Age: 63
End: 2023-01-23
Payer: COMMERCIAL

## 2023-01-23 VITALS
BODY MASS INDEX: 19.77 KG/M2 | RESPIRATION RATE: 14 BRPM | WEIGHT: 130 LBS | OXYGEN SATURATION: 99 % | SYSTOLIC BLOOD PRESSURE: 114 MMHG | HEART RATE: 65 BPM | TEMPERATURE: 97.6 F | DIASTOLIC BLOOD PRESSURE: 68 MMHG

## 2023-01-23 DIAGNOSIS — R73.9 HYPERGLYCEMIA: ICD-10-CM

## 2023-01-23 DIAGNOSIS — E78.2 MIXED HYPERLIPIDEMIA: ICD-10-CM

## 2023-01-23 DIAGNOSIS — S10.12XA BLISTER OF THROAT: Primary | ICD-10-CM

## 2023-01-23 DIAGNOSIS — E03.9 ACQUIRED HYPOTHYROIDISM: ICD-10-CM

## 2023-01-23 PROCEDURE — 99213 OFFICE O/P EST LOW 20 MIN: CPT | Performed by: INTERNAL MEDICINE

## 2023-01-23 ASSESSMENT — PATIENT HEALTH QUESTIONNAIRE - PHQ9
2. FEELING DOWN, DEPRESSED OR HOPELESS: 0
SUM OF ALL RESPONSES TO PHQ QUESTIONS 1-9: 0
1. LITTLE INTEREST OR PLEASURE IN DOING THINGS: 0
SUM OF ALL RESPONSES TO PHQ QUESTIONS 1-9: 0
SUM OF ALL RESPONSES TO PHQ9 QUESTIONS 1 & 2: 0
SUM OF ALL RESPONSES TO PHQ QUESTIONS 1-9: 0
SUM OF ALL RESPONSES TO PHQ QUESTIONS 1-9: 0

## 2023-01-23 NOTE — PROGRESS NOTES
Occupational Therapy Re-Evaluation     Patient Name: Catracho Ledesma  VFMTC'H Date: 11/23/2022  Problem List  Principal Problem:    Colon cancer metastasized to liver [facility])  Active Problems:    Benign essential hypertension    Type 2 diabetes mellitus with hyperlipidemia (HCC)    Malignant neoplasm of transverse colon (HCC)    Encephalopathy    Flash pulmonary edema (HCC)    MR (mitral regurgitation)    Bacteremia    ESBL (extended spectrum beta-lactamase) producing bacteria infection    Acute respiratory failure with hypoxia ([facility] )    Past Medical History  Past Medical History:   Diagnosis Date    Abdominal pain 03/12/2022    Acute renal failure ([facility] )     [id number] resolved    Cancer ([facility] )     Diabetes mellitus ([place] [address] )     Enteritis 08/23/2016    Gastroparesis due to DM ([facility] ) 08/23/2016    GERD (gastroesophageal reflux disease)     Hernia, ventral 08/04/2016    Hyperlipidemia     Hypertension     Morbid obesity ([facility] ) 04/17/2018    Postoperative visit 03/02/2022    SIRS (systemic inflammatory response syndrome) ([place] [address] ) 03/12/2022    Snoring     Stage 3a chronic kidney disease ([facility] ) 02/19/2022     Past Surgical History  Past Surgical History:   Procedure Laterality Date    COLONOSCOPY      COLOSTOMY N/A 02/20/2022    Procedure: COLOSTOMY LOOP, diverting;  Surgeon: Hieu Orr MD;  Location: [facility];  Service: General    ESOPHAGOGASTRODUODENOSCOPY N/A 08/24/2016    Procedure: ESOPHAGOGASTRODUODENOSCOPY (EGD); Surgeon: Casey Alfonso MD;  Location: [facility];   Service:     EXPLORATORY LAPAROTOMY W/ BOWEL RESECTION N/A 11/16/2022    Procedure: LAPAROTOMY EXPLORATORY W/ BOWEL RESECTION- VAC PLACEMENT;  Surgeon: Cristiana Azevedo MD;  Location: [facility];  Service: Surgical Oncology    EXPLORATORY LAPAROTOMY W/ BOWEL RESECTION N/A 11/17/2022    Procedure: LAPAROTOMY EXPLORATORY W/ BOWEL RESECTION;  Surgeon: Cristiana Azevedo MD;  Location: [facility];  Service: Surgical Oncology    ILEOSTOMY N/A 11/17/2022 Subjective:      Patient ID: Shauna Correa is a 63 y.o. female. HPI  C/o seeing nodules in the throat, the last few days,  No fever or chills,   Had covid last month,  Had some sore throat 2 weeks ago,  Was placed on antibiotics - took the sore throat away,  No fever or chills, or cough or congestion,. Review of Systems  All the review of systems negative except above   Objective:   Physical Exam  /68 (Site: Left Upper Arm, Position: Sitting, Cuff Size: Medium Adult)   Pulse 65   Temp 97.6 °F (36.4 °C)   Resp 14   Wt 130 lb (59 kg)   LMP 02/26/2016   SpO2 99%   BMI 19.77 kg/m²    The physical exam reveals a patient who appears well, alert and oriented x 3, pleasant, cooperative. Vitals are as noted. Head is atraumatic and normocephalic. Eyes reveal normal conjunctiva, cornea normal, pupils are equal and rective to light. Nasal mucosa is normal. Throat is normal without exudates. Ears reveal normal tympanic membranes. Neck is supple and free of adenopathy, or masses. No thyromegaly. No jugular venous distension. Lungs are clear to auscultation, no rales or rhonchi noted. Heart sounds are regular , no murmurs, clicks, gallops or rubs. Abdomen is soft, no tenderness, masses or organomegaly. Bowel sounds are normally heard. Pelvis: normal. Extremities are normal. Peripheral pulses are normal. Screening neurological exam is normal without focal findings. Cranial nerves are intact, reflexes are symmetrical and muscle strength eaqual. Skin is normal without suspicious lesions noted. Assessment:      Throat nodules. Plan: Will refer to ENT.          Armen Boucher MD Procedure: ILEOSTOMY;  Surgeon: Mary Reinoso MD;  [facility];  Service: Surgical Oncology    ILEOSTOMY CLOSURE N/A 11/7/2022    Procedure: REVERSAL COLOSTOMY;  Surgeon: Tony Dueñas MD;  [facility];  Service: Surgical Oncology    IR PORT PLACEMENT  03/10/2022    KIDNEY STONE SURGERY      LIVER BIOPSY LAPAROSCOPIC N/A 02/20/2022    Procedure: DIAGNOSTIC LAPAROSCOPIC LIVER BIOPSY, DIVERTING LOOP COLOSTOMY ;  Surgeon: Joann Argueta MD;  Location: [facility];  Service: General    LIVER LOBECTOMY N/A 11/7/2022    Procedure: SEGMENT 3 LIVER RESECTION, ABLATION, INTRAOPERATIVE U/S OF LIVER, PERITONEAL BIOPSY;  Surgeon: Tony Dueñas MD;  [facility];  Service: Surgical Oncology    RIGHT COLON RESECTION N/A 11/7/2022    Procedure: EX LAP, TRANVERSE COLON RESECTION;  Surgeon: Tony Dueñas MD;  [facility]: [facility];  Service: Surgical Oncology    TONSILLECTOMY      UMBILICAL HERNIA REPAIR LAPAROSCOPIC N/A 04/26/2019    Procedure: LAPAROSCOPIC UMBILICAL HERNIA REPAIR;  Surgeon: Joann Argueta MD;  [facility]: [facility];  Service: General    VAC DRESSING APPLICATION N/A 13/99/3120    Procedure: APPLICATION VAC DRESSING ABDOMEN/TRUNK;  Surgeon: Mary Reinoso MD;  [facility];  Service: Surgical Oncology         11/23/22 1208   OT Last Visit   OT Visit Date 11/23/22   Note Type   Note type Re-Evaluation   Pain Assessment   Pain Assessment Tool FLACC   Pain Rating: FLACC (Rest) - Face 1   Pain Rating: FLACC (Rest) - Legs 1   Pain Rating: FLACC (Rest) - Activity 1   Pain Rating: FLACC (Rest) - Cry 1   Pain Rating: FLACC (Rest) - Consolability 1   Score: FLACC (Rest) 5   Pain Rating: FLACC (Activity) - Face 2   Pain Rating: FLACC (Activity) - Legs 1   Pain Rating: FLACC (Activity) - Activity 1   Pain Rating: FLACC (Activity) - Cry 1   Pain Rating: FLACC (Activity) - Consolability 1   Score: FLACC (Activity) 6   Restrictions/Precautions   Weight Bearing Precautions Per Order No Other Precautions Contact/isolation; Bed Alarm;Multiple lines;Telemetry;O2;Fall Risk;Pain  (SCD, VICTORINO drains, 2L O2 NC, wound vac, NG tube)   Home Living   Additional Comments please see IE for home living information  Prior Function   Comments Please see IE for PLOF  Lifestyle   Autonomy I w/ ADLS/IADLS, transfers and functional mobility PTA   Reciprocal Relationships Pt lives w/ his spouse and kids   Service to Others on SSD   Intrinsic Gratification Does [employer]; 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; goes on tour  Subjective   Subjective "pins and needles"   ADL   Where Assessed Edge of bed   Eating Assistance Unable to assess  (NG tube)   Grooming Assistance 4  Minimal Assistance   UB Bathing Assistance 3  Moderate Assistance   LB Bathing Assistance 2  Maximal Assistance   UB Dressing Assistance 3  Moderate Assistance   LB Dressing Assistance 2  Maximal [address]  1  Total Assistance   Toileting Deficit Use of bedpan/urinal setup  (rivero)   Functional Assistance 3  Moderate Assistance   Additional Comments min-max a level of assistance at this time due to pt's decreased strength and overall weakness - requires assistance for overall safety and well-being during ADLs  Bed Mobility   Rolling R 2  Maximal assistance   Additional items Assist x 2; Increased time required;Verbal cues;LE management;HOB elevated   Supine to Sit 2  Maximal assistance   Additional items Assist x 2;HOB elevated; Increased time required;Verbal cues;LE management  (EOB sitting balance - required min-mod A x1 w/trunk support)   Sit to Supine 2  Maximal assistance   Additional items Assist x 2;HOB elevated; Increased time required;Verbal cues;LE management   Additional Comments Pt was found supine in bed w/bed alarm intact and left supine in bed w/bed alarm intact and call bell in reach     Transfers   Additional Comments unable to assess   Functional Mobility   Additional Comments unable to assess Balance   Static Sitting Poor   Dynamic Sitting Poor -   Activity Tolerance   Activity Tolerance Patient limited by fatigue;Patient limited by pain   Medical Staff Made Aware DPT Sophie Lipps, OT Darian Quinonez   Nurse Made Aware RN aware   RUE Assessment   RUE Assessment WFL   LUE Assessment   LUE Assessment WFL   Hand Function   Gross Motor Coordination Functional   Fine Motor Coordination Functional   Psychosocial   Psychosocial (WDL) X   Patient Behaviors/Mood Other (Comment)  (pt insisted that he was cleared by doctor to have liquids, pt states "I am so thirsty")   Cognition   Overall Cognitive Status Impaired  (Pt oriented x4, but noted w/decreased safety awareness and insight into condition)   Arousal/Participation Responsive   Attention Attends with cues to redirect   Orientation Level Oriented X4   Memory Decreased recall of precautions   Following Commands Follows one step commands with increased time or repetition   Comments Pt minimally follows conversation and kept repeating "pins and needles" t/o OT eval  Pt requires increased time 2' overall pain and decreased strength  Pt demonstrates F safety awareness and insight into condition  Assessment   Limitation Decreased ADL status; Decreased UE ROM; Decreased UE strength;Decreased Safe judgement during ADL;Decreased cognition;Decreased endurance;Decreased self-care trans;Decreased high-level ADLs   Prognosis Fair   Assessment Pt seen for OT re-evaluation due to restoration of intestinal continuity, formation of loop ileostomy, and abdominal closure on 11/17/22  Pt extubated yesterday (11/22/22), on 2L O2 NC  For home set-up living information + PLOF please see IE  Current level of function + assistance required is as follows: unable to assess eating (NG tube), MIN A for grooming, MOD A for UB bathing/dressing, MAX A for LB bathing/dressing, Total A for toileting assistance, MOD A for functional assistance, and MAX A x2 for bed mobility   Pt required min-mod A x1 w/trunk support for EOB sitting balance  Unable to assess transitions + fnxl mobility 2'overall decreased strength and weakness  Limitations include decreased ADL status, decreased UE ROM/strength, decreased safe judgment during ADLs, decreased cog, decreased endurance, decreased self-care trans, and decreased high-level ADLs  The patient's raw score on the AM-PAC Daily Activity inpatient short form is 10, standardized score is 33 39, less than 39 4  Patients at this level are likely to benefit from discharge to post-acute rehabilitation services  Please refer to the recommendation of the Occupational Therapist for safe discharge planning  Pt demonstrates decreased safety awareness and insight into condition + not within baseline functioning  Therefore, pt will benefit from STR upon d/c  Presently, recommendation is for pt to receive rehab services 2-4x/wk for 10-14 days to meet goals  Goals   Patient Goals "less pain"   LTG Time Frame 10-14   Plan   Treatment Interventions ADL retraining;Functional transfer training;UE strengthening/ROM; Endurance training;Cognitive reorientation;Patient/family training;Continued evaluation; Energy conservation; Activityengagement;Equipment evaluation/education   Goal Expiration Date 12/07/22   OT Frequency Other (comment)  (2-4x/wk)   Recommendation   OT Discharge Recommendation Post acute rehabilitation services   AM-[facility] Daily Activity Inpatient   Lower Body Dressing 1   Bathing 1   Toileting 1   Upper Body Dressing 2   Grooming 2   Eating 3   Daily Activity Raw Score 10   AM-PAC Applied Cognition Inpatient   Following a Speech/Presentation 3   Understanding Ordinary Conversation 4   Taking Medications 3   Remembering Where Things Are Placed or Put Away 3   Remembering List of 4-5 Errands 2   Taking Care of Complicated Tasks 2   Applied Cognition Raw Score 17   Applied Cognition Standardized Score 36 52   Modified Margaret Scale   Modified Margaret Scale 4   End of Consult   Education Provided Yes   Patient Position at End of Consult Supine;Bed/Chair alarm activated; All needs within reach   Nurse Communication Nurse aware of consult     Goals   Pt will complete grooming tasks independently using appropriate DME as needed   Pt will complete bed mobility (rolling L, R + sup>sit, sit>sup) independently to get EOB while maintaining safety in preparation for fnxl tasks   Pt will follow one step commands with 1-2 verbal cues for safe engagement in typical daily routine   Pt will complete UB ADLs independently using appropriate DME as needed   Pt will complete LB ADLs independently using appropriate DME as needed   Pt will complete toileting activity independently using appropriate DME as needed   Pt will increase activity tolerance to 30 minutes using appropriate DME as needed   OTR to assess transitions and fnxl mobility   Pt will increase UE strength +1MMT for UB ROM/strengthening using nuno Prasad Arm OTS

## 2023-02-07 DIAGNOSIS — E78.2 MIXED HYPERLIPIDEMIA: ICD-10-CM

## 2023-02-07 DIAGNOSIS — R73.9 HYPERGLYCEMIA: ICD-10-CM

## 2023-02-07 LAB
A/G RATIO: 2.5 (ref 1.1–2.2)
ALBUMIN SERPL-MCNC: 4.2 G/DL (ref 3.4–5)
ALP BLD-CCNC: 69 U/L (ref 40–129)
ALT SERPL-CCNC: 13 U/L (ref 10–40)
ANION GAP SERPL CALCULATED.3IONS-SCNC: 13 MMOL/L (ref 3–16)
AST SERPL-CCNC: 16 U/L (ref 15–37)
BASOPHILS ABSOLUTE: 0.1 K/UL (ref 0–0.2)
BASOPHILS RELATIVE PERCENT: 1.4 %
BILIRUB SERPL-MCNC: 0.5 MG/DL (ref 0–1)
BUN BLDV-MCNC: 12 MG/DL (ref 7–20)
CALCIUM SERPL-MCNC: 9.4 MG/DL (ref 8.3–10.6)
CHLORIDE BLD-SCNC: 103 MMOL/L (ref 99–110)
CHOLESTEROL, TOTAL: 176 MG/DL (ref 0–199)
CO2: 27 MMOL/L (ref 21–32)
CREAT SERPL-MCNC: 0.6 MG/DL (ref 0.6–1.2)
EOSINOPHILS ABSOLUTE: 0.1 K/UL (ref 0–0.6)
EOSINOPHILS RELATIVE PERCENT: 1.5 %
GFR SERPL CREATININE-BSD FRML MDRD: >60 ML/MIN/{1.73_M2}
GLUCOSE BLD-MCNC: 86 MG/DL (ref 70–99)
HCT VFR BLD CALC: 45.6 % (ref 36–48)
HDLC SERPL-MCNC: 68 MG/DL (ref 40–60)
HEMOGLOBIN: 15 G/DL (ref 12–16)
LDL CHOLESTEROL CALCULATED: 99 MG/DL
LYMPHOCYTES ABSOLUTE: 1 K/UL (ref 1–5.1)
LYMPHOCYTES RELATIVE PERCENT: 22.1 %
MCH RBC QN AUTO: 30.4 PG (ref 26–34)
MCHC RBC AUTO-ENTMCNC: 32.8 G/DL (ref 31–36)
MCV RBC AUTO: 92.5 FL (ref 80–100)
MONOCYTES ABSOLUTE: 0.4 K/UL (ref 0–1.3)
MONOCYTES RELATIVE PERCENT: 9.4 %
NEUTROPHILS ABSOLUTE: 2.9 K/UL (ref 1.7–7.7)
NEUTROPHILS RELATIVE PERCENT: 65.6 %
PDW BLD-RTO: 13.7 % (ref 12.4–15.4)
PLATELET # BLD: 190 K/UL (ref 135–450)
PMV BLD AUTO: 9 FL (ref 5–10.5)
POTASSIUM SERPL-SCNC: 4.3 MMOL/L (ref 3.5–5.1)
RBC # BLD: 4.93 M/UL (ref 4–5.2)
SODIUM BLD-SCNC: 143 MMOL/L (ref 136–145)
T4 FREE: 1.8 NG/DL (ref 0.9–1.8)
TOTAL PROTEIN: 5.9 G/DL (ref 6.4–8.2)
TRIGL SERPL-MCNC: 47 MG/DL (ref 0–150)
TSH REFLEX: 0.22 UIU/ML (ref 0.27–4.2)
VLDLC SERPL CALC-MCNC: 9 MG/DL
WBC # BLD: 4.5 K/UL (ref 4–11)

## 2023-02-08 LAB
ESTIMATED AVERAGE GLUCOSE: 93.9 MG/DL
HBA1C MFR BLD: 4.9 %

## 2023-04-03 ENCOUNTER — OFFICE VISIT (OUTPATIENT)
Dept: ORTHOPEDIC SURGERY | Age: 63
End: 2023-04-03
Payer: COMMERCIAL

## 2023-04-03 ENCOUNTER — TELEPHONE (OUTPATIENT)
Dept: ORTHOPEDIC SURGERY | Age: 63
End: 2023-04-03

## 2023-04-03 DIAGNOSIS — M25.551 RIGHT HIP PAIN: ICD-10-CM

## 2023-04-03 DIAGNOSIS — M76.891 HIP ABDUCTOR TENDINITIS, RIGHT: ICD-10-CM

## 2023-04-03 DIAGNOSIS — S76.011A HIP STRAIN, RIGHT, INITIAL ENCOUNTER: ICD-10-CM

## 2023-04-03 DIAGNOSIS — M25.551 GREATER TROCHANTERIC PAIN SYNDROME OF RIGHT LOWER EXTREMITY: ICD-10-CM

## 2023-04-03 PROCEDURE — 99214 OFFICE O/P EST MOD 30 MIN: CPT | Performed by: INTERNAL MEDICINE

## 2023-04-03 NOTE — PROGRESS NOTES
Examination of the left lower extremity does not show any tenderness, deformity or injury. Range of motion is unremarkable. There is no gross instability. There are no rashes, ulcerations or lesions. Strength and tone are normal.  Lower Back: Examination of the lower back does not show any tenderness, deformity or injury. Range of motion is unremarkable. There is no gross instability. There are no rashes, ulcerations or lesions. Strength and tone are normal.  Neurolgic -Light touch sensation and manual muscle testing normal L2-S1. No fasiculations. Pattella tendon and Achilles tendon reflexes +2 bilaterally. Seated SLR negative        Office Imaging Results/Procedures PerformedToday:      Radiology:      X-rays obtained and reviewed in office:   Views 2 views right hip   Location right hip   Impression: Femoral acetabular joint space is preserved and no stigmata of FIGUEROA or DDH. Normal trabecular pattern of the proximal femur. No other soft tissue or osseous abnormalities. Office Procedures:     Orders Placed This Encounter   Procedures    XR HIP 2-3 VW W PELVIS RIGHT     Order Specific Question:   Reason for exam:     Answer:   Right hip pain. [facility]     Referral Priority:   Routine     Referral Type:   Eval and Treat     Referral Reason:   Specialty Services Required     Requested Specialty:   Physical Therapist     Number of Visits Requested:   1           Other Outside Imaging and Testing Personally Reviewed:    No results found. Assessment   Impression: . Encounter Diagnoses   Name Primary?     Greater trochanteric pain syndrome of right lower extremity     Hip abductor tendinitis, right     Hip strain, right, initial encounter     Right hip pain               Plan:       Hip abductor stretching home exercise program  OTC NSAIDs with GI precaution daily for the next 5 to 7 days repeat dose in the evening as needed  Formal course of PT

## 2023-04-04 ENCOUNTER — HOSPITAL ENCOUNTER (OUTPATIENT)
Dept: PHYSICAL THERAPY | Age: 63
Setting detail: THERAPIES SERIES
Discharge: HOME | End: 2023-04-04
Payer: COMMERCIAL

## 2023-04-04 PROCEDURE — 97110 THERAPEUTIC EXERCISES: CPT

## 2023-04-04 PROCEDURE — 97161 PT EVAL LOW COMPLEX 20 MIN: CPT

## 2023-04-04 NOTE — FLOWSHEET NOTE
The [facility] and [facility], [address] [place] [address] 3360 [facility], [address] [place] [address]  Phone: [phone]   Fax:     [phone]    Physical Therapy Daily Treatment Note  Date:  2023    Patient Name:  Krystle Sweeney    :  1960  MRN: [id number]  Restrictions/Precautions:    Medical/Treatment Diagnosis Information:  Diagnosis: M25.551 (ICD-10-CM) - Right hip pain  Treatment Diagnosis: M25.551 (ICD-10-CM) - Right hip pain  Insurance/Certification information:  PT Insurance Information: Leann Moran 150  Physician Information:   Michoacano Hernández  Has the plan of care been signed (Y/N):        []  Yes  [x]  No     Date of Patient follow up with Physician:       Is this a Progress Report:     []  Yes  [x]  No        If Yes:  Date Range for reporting period:  Beginning   Ending    Progress report will be due (10 Rx or 30 days whichever is less):       Recertification will be due (POC Duration  / 90 days whichever is less):         Visit # Insurance Allowable Auth Required   1 20 visits [x]  Yes []  No        Functional Scale: LEFI 56/80    Date assessed:       Latex Allergy:  [x]NO      []YES  Preferred Language for Healthcare:   [x]English       []other:      Pain level:  2-8/10     SUBJECTIVE:  See eval    OBJECTIVE: See eval  Observation:   Test measurements:        ROM PROM AROM Comments    Left Right Left Right    Flexion WNL WNL WNL WNL    Extension 30 20 15 7    Abduction WNL WNL 42 38    Adduction        ER   35 35    IR   30 32              Flexibility Left Right Comments   Hamstrings - -    ITB (Obers test) - -    Hip flexor(Sandip test)  + quad    gastroc  -    Rectus femoris(Elys test)  +            Special  Test Left Right Comments   FABERS  -    Scour test      Impingement test  + Not reproducing patients comparable sign   Trendelenburg test  -    Resisted Hip ER/ ABD  -      Strength Left Right Comments   Hip flexors 5/5 3-/5    Hip

## 2023-04-04 NOTE — THERAPY EVALUATION
The [address] [place] [address] and [address]  Phone [phone]  Fax [phone]                                                         Physical Therapy Certification    Dear Dr. Dinesh Rosales ,    We had the pleasure of evaluating the following patient for physical therapy services at [address] [place] [address]. A summary of our findings can be found in the initial assessment below. This includes our plan of care. If you have any questions or concerns regarding these findings, please do not hesitate to contact me at the office phone number checked above. Thank you for the referral.       Physician Signature:_______________________________Date:__________________  By signing above (or electronic signature), therapists plan is approved by physician      Patient: Kiesha Spivey   : 1960   MRN: [id number]  Referring Physician:  Bianca Crawford      Evaluation Date: 2023      Medical Diagnosis Information:  Diagnosis: M25.551 (ICD-10-CM) - Right hip pain   Treatment Diagnosis: M25.551 (ICD-10-CM) - Right hip pain                                         Insurance information: PT Insurance Information: BCBS     Precautions/ Contra-indications:     C-SSRS Triggered by Intake questionnaire (Past 2 wk assessment):   [x] No, Questionnaire did not trigger screening.   [] Yes, Patient intake triggered further evaluation      [] C-SSRS Screening completed  [] PCP notified via Plan of Care  [] Emergency services notified     Latex Allergy:  [x]NO      []YES  Preferred Language for Healthcare:   [x]English       []other:    SUBJECTIVE: Patient states that about a month ago she felt soreness in both of her legs as if she exercised a lot. Patient states that now its mostly her right side and has gotten a little worse over the last few weeks.  Patient states she was sitting in 09-Dec-2020 01:48 09-Dec-2020 17:39

## 2023-04-06 ENCOUNTER — HOSPITAL ENCOUNTER (OUTPATIENT)
Dept: PHYSICAL THERAPY | Age: 63
Setting detail: THERAPIES SERIES
Discharge: HOME | End: 2023-04-06
Payer: COMMERCIAL

## 2023-04-06 PROCEDURE — 97110 THERAPEUTIC EXERCISES: CPT

## 2023-04-06 PROCEDURE — 97112 NEUROMUSCULAR REEDUCATION: CPT

## 2023-04-06 NOTE — FLOWSHEET NOTE
plan (see comments above)  [] Plan of care initiated [] Hold pending MD visit [] Discharge      Electronically signed by:  Alex Grant PT, DPT, DORON    Note: If patient does not return for scheduled/ recommended follow up visits, this note will serve as a discharge from care along with most recent update on progress.

## 2023-04-11 ENCOUNTER — APPOINTMENT (OUTPATIENT)
Dept: PHYSICAL THERAPY | Age: 63
End: 2023-04-11
Payer: COMMERCIAL

## 2023-04-25 ENCOUNTER — HOSPITAL ENCOUNTER (OUTPATIENT)
Dept: PHYSICAL THERAPY | Age: 63
Setting detail: THERAPIES SERIES
Discharge: HOME | End: 2023-04-25
Payer: COMMERCIAL

## 2023-04-25 PROCEDURE — 97110 THERAPEUTIC EXERCISES: CPT

## 2023-04-25 PROCEDURE — 97112 NEUROMUSCULAR REEDUCATION: CPT

## 2023-04-25 NOTE — FLOWSHEET NOTE
in movement, function, and ADLs as indicated by Functional Deficits. [] Progressing: [] Met: [] Not Met: [] Adjusted    Long Term Goals: To be achieved in: 6 weeks  1. LEFS score of 75 or greater to assist with reaching prior level of function. [] Progressing: [] Met: [] Not Met: [] Adjusted  2. Patient will demonstrate increased AROM of R hip to WNL to allow for proper joint functioning during gait training and transfers   [] Progressing: [] Met: [] Not Met: [] Adjusted  3. Patient will demonstrate an increase in RLE Strength to 5/5 to allow for proper functional mobility and improved ability  in sit to stand transfers. [] Progressing: [] Met: [] Not Met: [] Adjusted  4. Patient will return to all functional activities without increased symptoms or restriction. [] Progressing: [] Met: [] Not Met: [] Adjusted        Overall Progression Towards Functional goals/ Treatment Progress Update:  [] Patient is progressing as expected towards functional goals listed. [] Progression is slowed due to complexities/Impairments listed. [] Progression has been slowed due to co-morbidities. [x] Plan just implemented, too soon to assess goals progression <30days   [] Goals require adjustment due to lack of progress  [] Patient is not progressing as expected and requires additional follow up with physician  [] Other    Prognosis for POC: [x] Good [] Fair  [] Poor      Patient requires continued skilled intervention: [x] Yes  [] No    Treatment/Activity Tolerance:  [x] Patient able to complete treatment  [] Patient limited by fatigue  [] Patient limited by pain     [] Patient limited by other medical complications  [x] Other: Continued progress with POC with reduction in pain level to right thigh/hip. Tolerating therapeutic exercises well in both OC and CC. Able to tolerate increase in resistance without c/o pain although rest breaks required.      Patient Education:  Patient educated on diagnosis, prognosis, POC, HEP,

## 2023-05-01 ENCOUNTER — OFFICE VISIT (OUTPATIENT)
Dept: PRIMARY CARE CLINIC | Age: 63
End: 2023-05-01
Payer: COMMERCIAL

## 2023-05-01 VITALS
WEIGHT: 130 LBS | SYSTOLIC BLOOD PRESSURE: 116 MMHG | OXYGEN SATURATION: 98 % | TEMPERATURE: 97.9 F | RESPIRATION RATE: 14 BRPM | BODY MASS INDEX: 19.77 KG/M2 | HEART RATE: 63 BPM | DIASTOLIC BLOOD PRESSURE: 72 MMHG

## 2023-05-01 DIAGNOSIS — M79.604 BILATERAL LEG PAIN: ICD-10-CM

## 2023-05-01 DIAGNOSIS — M79.605 BILATERAL LEG PAIN: ICD-10-CM

## 2023-05-01 DIAGNOSIS — E03.9 ACQUIRED HYPOTHYROIDISM: Chronic | ICD-10-CM

## 2023-05-01 PROCEDURE — 99213 OFFICE O/P EST LOW 20 MIN: CPT | Performed by: INTERNAL MEDICINE

## 2023-05-01 NOTE — ASSESSMENT & PLAN NOTE
Has had bilateral thigh pain for a few weeks ,   Did see ortho , had xrays done ,referred to PT. No trauma  Or falls. No new meds,   Feels bruised ,   Tried nsaids,   She is concerned about MS,   No specific cause identified.

## 2023-05-01 NOTE — PROGRESS NOTES
Subjective:      Patient ID: Cheryl Robledo is a 63 y.o. female. HPI  Established patient here for a visit to manage acute and chronic medical conditions as detailed below. Bilateral leg pain  Has had bilateral thigh pain for a few weeks ,   Did see ortho , had xrays done ,referred to PT. No trauma  Or falls. No new meds,   Feels bruised ,   Tried nsaids,   She is concerned about MS,   No specific cause identified. Acquired hypothyroidism  On synthroid,  Patient is compliant w medications, no side effects, effective, provides adequate symptom relief. No new symptoms or problems as noted by patient. The problem is stable, no changes noted by patient. Will consider monitoring labs and refill medications as appropriate. Patient counseled and will continue current plan. Review of Systems  ROS: No unusual headaches or allergy symptoms or blurred vision. No prolonged cough. No flushing or facial pain or chest pain,dizziness, dyspnea, palpitations, or chest pain on exertion. No syncope. No nausea or vommitting or diarrhea. No jaundice or abdominal pain, change in bowel habits, black or bloody stools. No dysuria or hematuria or frequency of urination. No myalgias or muscle pain. No numbness, weakness, or tingling. No falls, or loss of consciousness. No weight loss or back pain. No falls. No paresthesias. No joint swelling or redness. No joint pain. No recent weight loss. No focal weakness or sensory deficits or paresthesias, No confusion or altered sensorium. No hematemesis. No hearing loss. No siezures. All other systems were reviewed, and review was negative. Objective:   Physical Exam  /72 (Site: Left Upper Arm, Position: Sitting, Cuff Size: Medium Adult)   Pulse 63   Temp 97.9 °F (36.6 °C)   Resp 14   Wt 130 lb (59 kg)   LMP 02/26/2016   SpO2 98%   BMI 19.77 kg/m²    The physical exam reveals a patient who appears well, alert and oriented x 3, pleasant, cooperative.

## 2023-05-09 ENCOUNTER — HOSPITAL ENCOUNTER (OUTPATIENT)
Dept: PHYSICAL THERAPY | Age: 63
Setting detail: THERAPIES SERIES
Discharge: HOME | End: 2023-05-09
Payer: COMMERCIAL

## 2023-05-09 PROCEDURE — 97110 THERAPEUTIC EXERCISES: CPT

## 2023-05-09 PROCEDURE — 97112 NEUROMUSCULAR REEDUCATION: CPT

## 2023-05-09 NOTE — FLOWSHEET NOTE
flexor(Sandip test)  -    gastroc  -    Rectus femoris(Elys test)  +            Special  Test Left Right Comments   FABERS  -    Scour test      Impingement test  + Not reproducing patients comparable sign   Trendelenburg test  -    Resisted Hip ER/ ABD  -      Strength Left Right Comments   Hip flexors 5/5 4/5    Hip extension 5/5 4/5    Hip abduction 5/5 4/5    Hip adduction 5/5     Hip ER 5/5 4/5    Hip IR 5/5 4/5    Quads 5/5 4/5    Hamstrings 5/5 4/5        RESTRICTIONS/PRECAUTIONS:     Exercises/Interventions:     ROM/STRETCHES     Seated hamstring      Seated piriformis     Supine piriformis 3 x 30\" 5/9   Kneeling Hip flexor stretch 3 x 30 \" 5/9   Quad  Prone 3 x 30\" with bolster     ITB     Butterfly     Hip flexor stretch kneeling     PREs     SLR 2 x 10 2# ^ 4/25   abduction 2 x 10 2# ^ 4/25   adduction     Standing Resisted Marching Red 2 x 10 4/6   Seated hip flexion with ER     clams S/L 3 x 30\" blue mini band ^ 4/6   SL dead lift     Monster walks     Wall sit with tband     bridges 3 x 10 with blue mini band    Lateral Touchdown Lvl 2 2 x 10 ^ 5/9   Leg Press 65# 2 x 10 4/13   Manual interventions              Therapeutic Exercise and NMR EXR  [x] (35140) Provided verbal/tactile cueing for activities related to strengthening, flexibility, endurance, ROM for improvements in LE, proximal hip, and core control with self care, mobility, lifting, ambulation.  [] (90527) Provided verbal/tactile cueing for activities related to improving balance, coordination, kinesthetic sense, posture, motor skill, proprioception  to assist with LE, proximal hip, and core control in self care, mobility, lifting, ambulation and eccentric single leg control.      NMR and Therapeutic Activities:    [] (75594 or 57511) Provided verbal/tactile cueing for activities related to improving balance, coordination, kinesthetic sense, posture, motor skill, proprioception and motor activation to allow for proper function of core,

## 2023-05-31 ENCOUNTER — OFFICE VISIT (OUTPATIENT)
Dept: ORTHOPEDIC SURGERY | Age: 63
End: 2023-05-31
Payer: COMMERCIAL

## 2023-05-31 VITALS — BODY MASS INDEX: 19.82 KG/M2 | WEIGHT: 130.3 LBS

## 2023-05-31 DIAGNOSIS — S76.011D HIP STRAIN, RIGHT, SUBSEQUENT ENCOUNTER: ICD-10-CM

## 2023-05-31 DIAGNOSIS — M76.891 CAPSULITIS OF RIGHT HIP: Primary | ICD-10-CM

## 2023-05-31 PROCEDURE — 99214 OFFICE O/P EST MOD 30 MIN: CPT | Performed by: INTERNAL MEDICINE

## 2023-05-31 RX ORDER — METHYLPREDNISOLONE 4 MG/1
TABLET ORAL
Qty: 1 KIT | Refills: 0 | Status: SHIPPED | OUTPATIENT
Start: 2023-05-31

## 2023-05-31 NOTE — PROGRESS NOTES
Chief Complaint:   Chief Complaint   Patient presents with    Leg Pain     F/u R leg. Does feel like she has improved some, but is definitely still having pain. Pain is mainly down anterior thigh, but is also having some across hip and in groin. History of Present Illness:       Patient is a 66 y.o. female returns follow up for the above complaint. The patient was last seen approximately 2 monthsago. The symptoms are improving since the last visit. The patient has had no further testing for the problem. Although improved the symptoms remain problematic    Pain has centralized in localized primarily to the anterior groin and proximal thigh    Pain levels 1/10    She denies mechanical symptoms    She denies any new onset or progressive weakness of the lower extremity she denies any neuritic quality of pain    She has completed a course of physical therapy with plateaued improvement     Past Medical History:        Past Medical History:   Diagnosis Date    Acquired hypothyroidism     Allergic rhinitis, cause unspecified     Anxiety state, unspecified     Chronic lymphocytic thyroiditis     Colon polyp 2019    Diverticulitis of colon (without mention of hemorrhage)(562.11)     ? diagnosis; no diverticula noted on colonoscopy    Herpes simplex without mention of complication     Hormone replacement therapy (postmenopausal)     Mechelle Etienne    Hypothyroidism     Left thyroid nodule 2017    repeat thyroid sonogram in Dec, 2017. Nontoxic uninodular goiter     Osteopenia after menopause 2018    spine    Rosacea     Unspecified glaucoma(365.9)     Unspecified inflammatory disease of uterus         Present Medications:         Current Outpatient Medications   Medication Sig Dispense Refill    methylPREDNISolone (MEDROL, CARLI,) 4 MG tablet By mouth.  1 kit 0    Cetirizine HCl (ZYRTEC ALLERGY PO) Take by mouth daily      valACYclovir (VALTREX) 500 MG tablet Take 1 tablet by mouth 2 times daily as needed (herpes

## 2023-06-09 ENCOUNTER — TELEPHONE (OUTPATIENT)
Dept: ORTHOPEDIC SURGERY | Age: 63
End: 2023-06-09

## 2023-06-09 DIAGNOSIS — S76.011D HIP STRAIN, RIGHT, SUBSEQUENT ENCOUNTER: Primary | ICD-10-CM

## 2023-06-09 NOTE — TELEPHONE ENCOUNTER
I HAVE PLACED THE ORDER AND FORWARDED TO ORDER TO PS. TEAM WILL FORWARD APPROVAL FOR MRI ONCE OBTAINED BY PRE-CERT.

## 2023-06-09 NOTE — TELEPHONE ENCOUNTER
MRI R HIP ORDER    FAX TO:  [facility]     PLEASE CALL PATIENT ONCE APPROVED.      [phone]- RYNE

## 2023-07-26 ENCOUNTER — TELEPHONE (OUTPATIENT)
Dept: ORTHOPEDIC SURGERY | Age: 63
End: 2023-07-26

## 2023-07-26 NOTE — TELEPHONE ENCOUNTER
Test Results     Type of Test: MRI AT [facility]  Date of Test: 07/06  Location of Test: [facility]  Patient Contact Number: [phone]      THE PT HAD AN MRI DONE AT [facility] [place], [place] AND HASN'T HEARD FROM ANYONE REGARDING RESULTS. PLEASE CALL PT BACK WITH RESULTS.

## 2023-07-31 ENCOUNTER — OFFICE VISIT (OUTPATIENT)
Dept: ORTHOPEDIC SURGERY | Age: 63
End: 2023-07-31
Payer: COMMERCIAL

## 2023-07-31 DIAGNOSIS — M25.551 GREATER TROCHANTERIC PAIN SYNDROME OF RIGHT LOWER EXTREMITY: ICD-10-CM

## 2023-07-31 DIAGNOSIS — M94.251 CHONDROMALACIA OF HIP, RIGHT: ICD-10-CM

## 2023-07-31 DIAGNOSIS — M67.959 TENDINOPATHY OF HIP: ICD-10-CM

## 2023-07-31 PROCEDURE — 99213 OFFICE O/P EST LOW 20 MIN: CPT | Performed by: INTERNAL MEDICINE

## 2023-08-01 NOTE — PROGRESS NOTES
intra-articular etiology. Proceed as outlined above       Orders:      No orders of the defined types were placed in this encounter. Chevy Stauffer MD.      Disclaimer: \"This note was dictated with voice recognition software. Though review and correction are routine, we apologize for any errors. \"

## 2024-07-22 ENCOUNTER — OFFICE VISIT (OUTPATIENT)
Dept: ORTHOPEDIC SURGERY | Age: 64
End: 2024-07-22
Payer: COMMERCIAL

## 2024-07-22 VITALS — BODY MASS INDEX: 19.75 KG/M2 | WEIGHT: 130.29 LBS | HEIGHT: 68 IN

## 2024-07-22 DIAGNOSIS — G56.21 ULNAR NEURITIS, RIGHT: ICD-10-CM

## 2024-07-22 DIAGNOSIS — R25.2 MUSCLE CRAMPING: ICD-10-CM

## 2024-07-22 DIAGNOSIS — M79.641 RIGHT HAND PAIN: Primary | ICD-10-CM

## 2024-07-22 DIAGNOSIS — G56.21 GUYON SYNDROME, RIGHT: ICD-10-CM

## 2024-07-22 PROCEDURE — L3908 WHO COCK-UP NONMOLDE PRE OTS: HCPCS | Performed by: INTERNAL MEDICINE

## 2024-07-22 PROCEDURE — 99214 OFFICE O/P EST MOD 30 MIN: CPT | Performed by: INTERNAL MEDICINE

## 2024-07-22 NOTE — PROGRESS NOTES
Chief Complaint:   Chief Complaint   Patient presents with    Hand Pain     right, has had occ pain along 5th met and into pinky finger, but then 1 week ago went to  grandson and felt increased pain in that area, has since improved, no pain today    Leg Pain     bilat calf and foot cramping only at night, calves have been cramping for yrs, but the foot cramping has begun more recently, already taking Mg supplement          History of Present Illness:       Patient is a 64 y.o. female returns follow up for the above complaint.  Patient presents with insidious onset pain localizing to the ulnar aspect of the hand radiating into the small finger aching in quality ongoing for the past 3 to 4 weeks.  Fortunately symptoms are improving but this represents a change from baseline.    Pain localizes to the  Hypothenar eminence and radiates into the ulnar aspect of the hand and small finger and is not asociated with mechanical symptoms. The patient denies subjective instability and denies new onset or progressive weakness of the hand.    Pain level 0-4    The patient denies a pattern of activity related swelling.      Treatment to date: none.     There is no prior history of hand trauma.    There is no prior history of autoimmune disease, inflammatory arthropathy, or crystal arthropathy.        Past Medical History:        Past Medical History:   Diagnosis Date    Acquired hypothyroidism     Allergic rhinitis, cause unspecified     Anxiety state, unspecified     Chronic lymphocytic thyroiditis     Colon polyp 2019    Diverticulitis of colon (without mention of hemorrhage)(562.11)     ? diagnosis; no diverticula noted on colonoscopy    Herpes simplex without mention of complication     Hormone replacement therapy (postmenopausal)     Mechelle Etienne    Hypothyroidism     Left thyroid nodule 2017    repeat thyroid sonogram in Dec, 2017.    Nontoxic uninodular goiter     Osteopenia after menopause 2018    spine    Rosacea

## 2025-02-21 ENCOUNTER — APPOINTMENT (OUTPATIENT)
Age: 65
End: 2025-02-21
Payer: COMMERCIAL

## 2025-02-21 ENCOUNTER — HOSPITAL ENCOUNTER (EMERGENCY)
Age: 65
Discharge: HOME | End: 2025-02-21
Attending: EMERGENCY MEDICINE
Payer: COMMERCIAL

## 2025-02-21 VITALS
OXYGEN SATURATION: 100 % | RESPIRATION RATE: 16 BRPM | HEIGHT: 68 IN | WEIGHT: 132.3 LBS | BODY MASS INDEX: 20.05 KG/M2 | DIASTOLIC BLOOD PRESSURE: 53 MMHG | SYSTOLIC BLOOD PRESSURE: 100 MMHG | TEMPERATURE: 98.6 F | HEART RATE: 66 BPM

## 2025-02-21 DIAGNOSIS — S09.90XA CLOSED HEAD INJURY, INITIAL ENCOUNTER: Primary | ICD-10-CM

## 2025-02-21 PROCEDURE — 70450 CT HEAD/BRAIN W/O DYE: CPT

## 2025-02-21 PROCEDURE — 99284 EMERGENCY DEPT VISIT MOD MDM: CPT

## 2025-02-21 ASSESSMENT — PAIN DESCRIPTION - DESCRIPTORS: DESCRIPTORS: ACHING

## 2025-02-21 ASSESSMENT — LIFESTYLE VARIABLES
HOW OFTEN DO YOU HAVE A DRINK CONTAINING ALCOHOL: 2-4 TIMES A MONTH
HOW MANY STANDARD DRINKS CONTAINING ALCOHOL DO YOU HAVE ON A TYPICAL DAY: 1 OR 2

## 2025-02-21 ASSESSMENT — PAIN SCALES - GENERAL: PAINLEVEL_OUTOF10: 2

## 2025-02-21 ASSESSMENT — PAIN DESCRIPTION - LOCATION: LOCATION: HEAD

## 2025-02-21 NOTE — ED PROVIDER NOTES
[facility] EMERGENCY DEPARTMENT  EMERGENCY DEPARTMENT ENCOUNTER        Pt Name: Shanda Mora  MRN: [id number]  Birthdate 1960  Date of evaluation: 2/21/2025  Provider: Emma Reece MD  PCP: Florencio Cox MD  Note Started: 11:17 AM EST 2/21/25    CHIEF COMPLAINT       Chief Complaint   Patient presents with    Fall    Head Injury       HISTORY OF PRESENT ILLNESS: 1 or more Elements     History from : Patient    Limitations to history : None    Shanda Mora is a 64 y.o. female who presents to the emergency department with head injury.  Patient states that 2 days ago she was walking outside when she slipped on the ice and fell.  She hit her head on the concrete.  She did not pass out.  No blurry vision, nausea, vomiting.  She does not take blood thinners.  She states yesterday she looked up at something and noted that she felt \"funny.\"  She just wants to make sure there is nothing wrong.  She did not hit her face.  She denies any other injuries from the fall    Nursing Notes were all reviewed and agreed with or any disagreements were addressed in the HPI.    REVIEW OF SYSTEMS :      Review of Systems    10 systems reviewed and negative except as in HPI/MDM    SURGICAL HISTORY     Past Surgical History:   Procedure Laterality Date    COLONOSCOPY  2019    Repeat in 2024 - 5 yrs    LAPAROSCOPY      ovarian cysts, multiple laparoscopies    ARTURO AND BSO (CERVIX REMOVED)  02/26/2016    Dr. Banuelos       CURRENTMEDICATIONS       Previous Medications    CALCIUM CARBONATE-VITAMIN D (CALTRATE) 600-400 MG-UNIT TABS PER TAB    Take 1 tablet by mouth    CETIRIZINE HCL (ZYRTEC ALLERGY PO)    Take by mouth daily    DICLOFENAC SODIUM (VOLTAREN) 1 % GEL    Apply 2 to 4 g 3 times daily for 2 weeks then twice daily as needed thereafter    ESTRADIOL (ESTRACE) 0.1 MG/GM VAGINAL CREAM    INSERT 1 GRAM VAGINALLY WITH APPLICATOR THREE TIMES WEEKLY    SYNTHROID 88 MCG TABLET    Take 88 mcg by

## 2025-02-21 NOTE — ED TRIAGE NOTES
Pt presents to the ED with complaints of a fall on wednesday. Pt states when she fell she hit the back of her head on a concrete step. Pt has presented to the ED due to yesterday when she looked up she got very dizzy and the pain increased. Pt denies LOC or blood thinners.

## 2025-02-21 NOTE — ED NOTES
Patient prepared for and ready to be discharged. Patient discharged at this time to home in care of self in no acute distress after verbalizing understanding of discharge instructions. Patient left after receiving After Visit Summary instructions. Pt offered snd decline wheelchair to the lobby. Pt ambulated out ED under own power.

## 2025-04-01 ENCOUNTER — OFFICE VISIT (OUTPATIENT)
Dept: PRIMARY CARE CLINIC | Age: 65
End: 2025-04-01
Payer: COMMERCIAL

## 2025-04-01 VITALS
DIASTOLIC BLOOD PRESSURE: 66 MMHG | WEIGHT: 124.2 LBS | HEIGHT: 68 IN | TEMPERATURE: 97.3 F | SYSTOLIC BLOOD PRESSURE: 112 MMHG | HEART RATE: 78 BPM | OXYGEN SATURATION: 99 % | BODY MASS INDEX: 18.82 KG/M2

## 2025-04-01 DIAGNOSIS — N89.8 VAGINAL DRYNESS: ICD-10-CM

## 2025-04-01 DIAGNOSIS — E78.2 MIXED HYPERLIPIDEMIA: ICD-10-CM

## 2025-04-01 DIAGNOSIS — E03.9 ACQUIRED HYPOTHYROIDISM: Chronic | ICD-10-CM

## 2025-04-01 DIAGNOSIS — Z11.4 SCREENING FOR HIV WITHOUT PRESENCE OF RISK FACTORS: ICD-10-CM

## 2025-04-01 DIAGNOSIS — Z11.59 NEED FOR HEPATITIS C SCREENING TEST: ICD-10-CM

## 2025-04-01 DIAGNOSIS — B00.1 RECURRENT COLD SORES: ICD-10-CM

## 2025-04-01 DIAGNOSIS — M85.80 OSTEOPENIA, UNSPECIFIED LOCATION: ICD-10-CM

## 2025-04-01 DIAGNOSIS — Z00.00 ANNUAL PHYSICAL EXAM: Primary | ICD-10-CM

## 2025-04-01 DIAGNOSIS — E55.9 VITAMIN D DEFICIENCY: ICD-10-CM

## 2025-04-01 PROBLEM — M79.605 BILATERAL LEG PAIN: Status: RESOLVED | Noted: 2023-05-01 | Resolved: 2025-04-01

## 2025-04-01 PROBLEM — M79.604 BILATERAL LEG PAIN: Status: RESOLVED | Noted: 2023-05-01 | Resolved: 2025-04-01

## 2025-04-01 PROBLEM — Z87.39 HISTORY OF OSTEOPENIA: Status: RESOLVED | Noted: 2018-10-24 | Resolved: 2025-04-01

## 2025-04-01 PROCEDURE — 99396 PREV VISIT EST AGE 40-64: CPT | Performed by: FAMILY MEDICINE

## 2025-04-01 RX ORDER — SODIUM PHOSPHATE,MONO-DIBASIC 19G-7G/118
1000 ENEMA (ML) RECTAL DAILY
COMMUNITY
End: 2025-04-01

## 2025-04-01 RX ORDER — LEVOTHYROXINE SODIUM 100 UG/1
100 TABLET ORAL DAILY
COMMUNITY

## 2025-04-01 SDOH — ECONOMIC STABILITY: FOOD INSECURITY: WITHIN THE PAST 12 MONTHS, THE FOOD YOU BOUGHT JUST DIDN'T LAST AND YOU DIDN'T HAVE MONEY TO GET MORE.: NEVER TRUE

## 2025-04-01 SDOH — ECONOMIC STABILITY: FOOD INSECURITY: WITHIN THE PAST 12 MONTHS, YOU WORRIED THAT YOUR FOOD WOULD RUN OUT BEFORE YOU GOT MONEY TO BUY MORE.: NEVER TRUE

## 2025-04-01 ASSESSMENT — ENCOUNTER SYMPTOMS
VOMITING: 0
SORE THROAT: 0
ABDOMINAL PAIN: 0
EYE DISCHARGE: 0
DIARRHEA: 0
SHORTNESS OF BREATH: 0
EYE ITCHING: 0
TROUBLE SWALLOWING: 0
COUGH: 0
NAUSEA: 0

## 2025-04-01 ASSESSMENT — PATIENT HEALTH QUESTIONNAIRE - PHQ9
2. FEELING DOWN, DEPRESSED OR HOPELESS: NOT AT ALL
SUM OF ALL RESPONSES TO PHQ QUESTIONS 1-9: 0
1. LITTLE INTEREST OR PLEASURE IN DOING THINGS: NOT AT ALL

## 2025-04-16 ENCOUNTER — OFFICE VISIT (OUTPATIENT)
Dept: PRIMARY CARE CLINIC | Age: 65
End: 2025-04-16
Payer: COMMERCIAL

## 2025-04-16 VITALS
DIASTOLIC BLOOD PRESSURE: 60 MMHG | SYSTOLIC BLOOD PRESSURE: 90 MMHG | BODY MASS INDEX: 18.79 KG/M2 | HEART RATE: 60 BPM | WEIGHT: 124 LBS | HEIGHT: 68 IN | OXYGEN SATURATION: 98 %

## 2025-04-16 DIAGNOSIS — R10.11 RIGHT UPPER QUADRANT PAIN: Primary | ICD-10-CM

## 2025-04-16 DIAGNOSIS — K76.89 HEPATIC CYST: ICD-10-CM

## 2025-04-16 DIAGNOSIS — K76.0 FATTY LIVER: ICD-10-CM

## 2025-04-16 PROCEDURE — 99213 OFFICE O/P EST LOW 20 MIN: CPT | Performed by: FAMILY MEDICINE

## 2025-04-16 ASSESSMENT — ENCOUNTER SYMPTOMS
EYE ITCHING: 0
NAUSEA: 0
SORE THROAT: 0
ABDOMINAL PAIN: 1
EYE DISCHARGE: 0
DIARRHEA: 0
COUGH: 0
TROUBLE SWALLOWING: 0
SHORTNESS OF BREATH: 0
VOMITING: 0

## 2025-04-16 NOTE — PROGRESS NOTES
Shanda Mora (:  1960) is a 64 y.o. female,Established patient, here for evaluation of the following chief complaint(s):  Abdominal Pain (RUQ pain /)      ASSESSMENT/PLAN:  1. Right upper quadrant pain  -     US GALLBLADDER RUQ; Future  2. Hepatic cyst  -     US GALLBLADDER RUQ; Future  3. Fatty liver  Assessment & Plan:       Unclear etiology of her right upper quadrant pain, not related to food but mostly when she gets her prime IVs  Will get an right upper quadrant ultrasound and based on the results refer out if needed otherwise discussed with [facility] to change some formulations to prevent the pain  She is agreeable to plan and demonstrates understanding    Return if symptoms worsen or fail to improve.    SUBJECTIVE/OBJECTIVE:  HPI    Pt presents with RUQ pain  - recent CT chest showed \"6.1 mm hypodense focus\", \"compatible with a benign cyst\" -most likely a hepatic cyst  - ongoing for a while, forgot to mention last visit  - had pain when she got CT scan done  - also goes to [facility] - last week Friday - when getting the drip it was hurting  - on oing 2-3 month  - no obv relation to food  - no relation to BM or urination   - no relation to position changes   - mostly when getting IV  - history of Fatty liver  - reviewed LFTs on her phone    Review of Systems   Constitutional:  Negative for appetite change, chills, fatigue and fever.   HENT:  Negative for congestion, ear pain, sneezing, sore throat and trouble swallowing.    Eyes:  Negative for discharge and itching.   Respiratory:  Negative for cough and shortness of breath.    Cardiovascular:  Negative for chest pain and leg swelling.   Gastrointestinal:  Positive for abdominal pain. Negative for diarrhea, nausea and vomiting.   Genitourinary:  Negative for dysuria and urgency.   Musculoskeletal:  Negative for joint swelling and neck pain.   Neurological:  Negative for light-headedness and headaches.   Psychiatric/Behavioral:  Negative for

## 2025-04-18 ENCOUNTER — RESULTS FOLLOW-UP (OUTPATIENT)
Dept: PRIMARY CARE CLINIC | Age: 65
End: 2025-04-18

## 2025-04-18 ENCOUNTER — HOSPITAL ENCOUNTER (OUTPATIENT)
Age: 65
Discharge: HOME | End: 2025-04-18
Payer: COMMERCIAL

## 2025-04-18 DIAGNOSIS — K76.0 FATTY LIVER: Primary | ICD-10-CM

## 2025-04-18 DIAGNOSIS — R10.11 RIGHT UPPER QUADRANT PAIN: ICD-10-CM

## 2025-04-18 DIAGNOSIS — K76.89 HEPATIC CYST: ICD-10-CM

## 2025-04-18 PROCEDURE — 76705 ECHO EXAM OF ABDOMEN: CPT

## 2025-04-22 ENCOUNTER — TRANSCRIBE ORDERS (OUTPATIENT)
Dept: ADMINISTRATIVE | Age: 65
End: 2025-04-22

## 2025-04-22 DIAGNOSIS — R10.11 RUQ PAIN: Primary | ICD-10-CM
